# Patient Record
Sex: FEMALE | Race: WHITE | Employment: FULL TIME | URBAN - METROPOLITAN AREA
[De-identification: names, ages, dates, MRNs, and addresses within clinical notes are randomized per-mention and may not be internally consistent; named-entity substitution may affect disease eponyms.]

---

## 2017-03-21 ENCOUNTER — EMPLOYEE WELLNESS (OUTPATIENT)
Dept: OTHER | Age: 57
End: 2017-03-21

## 2017-10-27 ENCOUNTER — HOSPITAL ENCOUNTER (OUTPATIENT)
Dept: GENERAL RADIOLOGY | Age: 57
Discharge: HOME | End: 2017-10-27
Payer: COMMERCIAL

## 2017-10-27 ENCOUNTER — HOSPITAL ENCOUNTER (OUTPATIENT)
Age: 57
Discharge: HOME | End: 2017-10-27
Payer: COMMERCIAL

## 2017-10-27 DIAGNOSIS — M25.512 PAIN IN JOINT OF LEFT SHOULDER: ICD-10-CM

## 2017-10-27 PROCEDURE — 73030 X-RAY EXAM OF SHOULDER: CPT

## 2017-12-05 ENCOUNTER — INITIAL CONSULT (OUTPATIENT)
Dept: PULMONOLOGY | Age: 57
End: 2017-12-05
Payer: COMMERCIAL

## 2017-12-05 VITALS
RESPIRATION RATE: 12 BRPM | WEIGHT: 155 LBS | OXYGEN SATURATION: 98 % | HEART RATE: 103 BPM | SYSTOLIC BLOOD PRESSURE: 126 MMHG | BODY MASS INDEX: 26.46 KG/M2 | DIASTOLIC BLOOD PRESSURE: 74 MMHG | HEIGHT: 64 IN

## 2017-12-05 DIAGNOSIS — R06.83 SNORING: ICD-10-CM

## 2017-12-05 DIAGNOSIS — R40.0 DAYTIME SLEEPINESS: ICD-10-CM

## 2017-12-05 DIAGNOSIS — R68.2 DRY MOUTH: ICD-10-CM

## 2017-12-05 DIAGNOSIS — G47.8 NON-RESTORATIVE SLEEP: ICD-10-CM

## 2017-12-05 DIAGNOSIS — G47.8 DIFFICULTY WAKING: ICD-10-CM

## 2017-12-05 DIAGNOSIS — G47.10 HYPERSOMNIA: Primary | ICD-10-CM

## 2017-12-05 PROCEDURE — 99203 OFFICE O/P NEW LOW 30 MIN: CPT | Performed by: INTERNAL MEDICINE

## 2017-12-05 NOTE — PATIENT INSTRUCTIONS
Patient Education        Learning About CPAP for Sleep Apnea  What is CPAP? CPAP is a small machine that you use at home every night while you sleep. It increases air pressure in your throat to keep your airway open. When you have sleep apnea, this can help you sleep better so you feel much better. CPAP stands for \"continuous positive airway pressure. \"  The CPAP machine will have one of the following:  · A mask that covers your nose and mouth  · Prongs that fit into your nose  · A mask that covers your nose only, the most common type. This type is called NCPAP. The N stands for \"nasal.\"  Why is it done? CPAP is usually the best treatment for obstructive sleep apnea. It is the first treatment choice and the most widely used. Your doctor may suggest CPAP if you have:  · Moderate to severe sleep apnea. · Sleep apnea and coronary artery disease (CAD) or heart failure. How does it help? · CPAP can help you have more normal sleep, so you feel less sleepy and more alert during the daytime. · CPAP may help keep heart failure or other heart problems from getting worse. · CPAP may help lower your blood pressure. · If you use CPAP, your bed partner may also sleep better because you are not snoring or restless. What are the side effects? Some people who use CPAP have:  · A dry or stuffy nose and a sore throat. · Irritated skin on the face. · Sore eyes. · Bloating. If you have any of these problems, work with your doctor to fix them. Here are some things you can try:  · Be sure the mask or nasal prongs fit well. · See if your doctor can adjust the pressure of your CPAP. · If your nose is dry, try a humidifier. · If your nose is runny or stuffy, try decongestant medicine or a steroid nasal spray. Be safe with medicines. Read and follow all instructions on the label. Do not use the medicine longer than the label says. If these things do not help, you might try a different type of machine.  Some machines have air pressure that adjusts on its own. Others have air pressures that are different when you breathe in than when you breathe out. This may reduce discomfort caused by too much pressure in your nose. Where can you learn more? Go to https://myrna.Playlore. org and sign in to your Ahaali account. Enter N640 in the [employer] box to learn more about \"Learning About CPAP for Sleep Apnea. \"     If you do not have an account, please click on the \"Sign Up Now\" link. Current as of: March 25, 2017  Content Version: 11.3  © 7577-1480 [employer] Care instructions adapted under license by [employer] [facility] ([facility]). If you have questions about a medical condition or this instruction, always ask your healthcare professional. [place] [address] any warranty or liability for your use of this information. Patient Education        Sleep Apnea: Care Instructions  Your Care Instructions  Sleep apnea means that you frequently stop breathing for 10 seconds or longer during sleep. It can be mild to severe, based on the number of times an hour that you stop breathing or have slowed breathing. Blocked or narrowed airways in your nose, mouth, or throat can cause sleep apnea. Your airway can become blocked when your throat muscles and tongue relax during sleep. You can treat sleep apnea at home by making lifestyle changes. You also can use a CPAP breathing machine that keeps tissues in the throat from blocking your airway. Or your doctor may suggest that you use a breathing device while you sleep. It helps keep your airway open. This could be a device that you put in your mouth. Other examples include strips or disks that you use on your nose. In some cases, surgery may be needed to remove enlarged tissues in the throat. Follow-up care is a key part of your treatment and safety. Be sure to make and go to all appointments, and call your doctor if you are having problems.  It's also a good idea to know your test results and keep a list of the medicines you take. How can you care for yourself at home? · Lose weight, if needed. It may reduce the number of times you stop breathing or have slowed breathing. · Sleep on your side. It may stop mild apnea. If you tend to roll onto your back, sew a pocket in the back of your pajama top. Put a tennis ball into the pocket, and stitch the pocket shut. This will help keep you from sleeping on your back. · Avoid alcohol and medicines such as sleeping pills and sedatives before bed. · Do not smoke. Smoking can make sleep apnea worse. If you need help quitting, talk to your doctor about stop-smoking programs and medicines. These can increase your chances of quitting for good. · Prop up the head of your bed 4 to 6 inches by putting bricks under the legs of the bed. · Treat breathing problems, such as a stuffy nose, caused by a cold or allergies. · Try a continuous positive airway pressure (CPAP) breathing machine if your doctor recommends it. The machine keeps your airway open when you sleep. · If CPAP does not work for you, ask your doctor if you can try other breathing machines. A bilevel positive airway pressure machine uses one type of air pressure for breathing in and another type for breathing out. Another device raises or lowers air pressure as needed while you breathe. · Talk to your doctor if:  ¨ Your nose feels dry or bleeds when you use one of these machines. You may need to increase moisture in the air. A humidifier may help. ¨ Your nose is runny or stuffy from using a breathing machine. Decongestants or a corticosteroid nasal spray may help. ¨ You are sleepy during the day and it gets in the way of the normal things you do. Do not drive when you are drowsy. When should you call for help?   Watch closely for changes in your health, and be sure to contact your doctor if:  · You still have sleep apnea even though you have made lifestyle changes. · You are thinking of trying a device such as CPAP. · You are having problems using a CPAP or similar machine. Where can you learn more? Go to https://chpepicewBelkin International.[email or web address] org and sign in to your Venmo account. Enter X912 in the [facility] box to learn more about \"Sleep Apnea: Care Instructions. \"     If you do not have an account, please click on the \"Sign Up Now\" link. Current as of: March 25, 2017  Content Version: 11.3  © 2379-7339 [employer]. Care instructions adapted under license by [facility] ([facility]). If you have questions about a medical condition or this instruction, always ask your healthcare professional. [address] any warranty or liability for your use of this information. Patient Education        Heart Failure and Sleep Apnea: Care Instructions  Your Care Instructions    Sleep apnea is fairly common in people with advanced heart failure. Sleep apnea means you stop breathing for 10 seconds or longer during sleep. It may cause you to snore loudly and not sleep well, so you wake up feeling tired. Getting treatment for sleep apnea can help you sleep and feel better. It may also help keep your heart failure from getting worse. Follow-up care is a key part of your treatment and safety. Be sure to make and go to all appointments, and call your doctor if you are having problems. It's also a good idea to know your test results and keep a list of the medicines you take. How can you care for yourself at home? · Lose weight, if needed. It may reduce the number of times you stop breathing or have slowed breathing. · Go to bed at the same time every night. · Sleep on your side. It may stop mild apnea. If you tend to roll onto your back, sew a pocket in the back of your pajama top. Put a tennis ball into the pocket, and stitch the pocket shut. This will help keep you from sleeping on your back.   · Avoid alcohol and medicines such as sleeping pills and sedatives before bed. · Do not smoke. Smoking can make heart failure and sleep apnea worse. If you need help quitting, talk to your doctor about stop-smoking programs and medicines. These can increase your chances of quitting for good. · Prop up the head of your bed 4 to 6 inches by putting bricks under the legs of the bed. · Try a continuous positive airway pressure (CPAP) breathing machine if your doctor recommends it. The machine keeps your airway from closing when you sleep. · If CPAP does not work for you, ask your doctor if you can try another type of machine or device to help you breathe better. · If your nose feels dry or bleeds when you use one of these machines, talk with your doctor about increasing moisture in the air. A humidifier may help. · If your nose is runny or stuffy from using a breathing machine, talk with your doctor before using medicines to relieve congestion. · Talk to your doctor if you are sleepy during the day and it gets in the way of the normal things you do. Do not drive when you are drowsy. When should you call for help? Call your doctor now or seek immediate medical care if:  · You are dizzy or lightheaded, or you feel like you may faint. · You feel very tired. Watch closely for changes in your health, and be sure to contact your doctor if:  · You still have sleep apnea even though you have made lifestyle changes. · You are thinking of trying a device such as CPAP. · You are having problems using a CPAP or similar machine. Where can you learn more? Go to https://[employer].BECC. org and sign in to your WellTrackOne account. Enter A820 in the [employer] box to learn more about \"Heart Failure and Sleep Apnea: Care Instructions. \"     If you do not have an account, please click on the \"Sign Up Now\" link. Current as of: April 3, 2017  Content Version: 11.3  © 3551-3625 [employer].  Care instructions adapted under

## 2017-12-05 NOTE — PROGRESS NOTES
Prescriptions   Medication Sig Dispense Refill    colestipol (COLESTID) 1 G tablet Take 2 g by mouth daily       levothyroxine (SYNTHROID) 25 MCG tablet Take 50 mcg by mouth Daily       vitamin E 400 UNIT capsule Take 400 Units by mouth daily      NONFORMULARY multivit      Calcium Carbonate (CALCIUM 600 PO) Take by mouth      NONFORMULARY Vit d 5000 units      MAGNESIUM GLYCINATE PLUS PO Take 100 mg by mouth      traZODone (DESYREL) 50 MG tablet Take 25 mg by mouth nightly      aspirin 81 MG tablet Take 81 mg by mouth daily.  montelukast (SINGULAIR) 10 MG tablet Take 10 mg by mouth nightly.  cetirizine (ZYRTEC) 10 MG tablet Take 10 mg by mouth nightly.  buPROPion (WELLBUTRIN XL) 150 MG XL tablet Take 150 mg by mouth 2 times daily.  ALPRAZolam (XANAX) 1 MG tablet Take 0.5 mg by mouth 2 times daily as needed  .  Clidinium-Chlordiazepoxide (LIBRAX PO) Take 1 capsule by mouth 4 times daily as needed.  azelastine (ASTELIN) 137 MCG/SPRAY nasal spray 2 sprays by Nasal route nightly. Use in each nostril as directed      NONFORMULARY Progesterone cream      NONFORMULARY Testosterone cream       No current facility-administered medications for this visit. Family History   Problem Relation Age of Onset    High Cholesterol Mother     Heart Disease Mother     High Cholesterol Father     Stroke Father         Any family history of any sleep problems or any one in your family on CPAP? Yes    Caffeine Intake: How much soda (pop), coffee, tea, power drinks do you ingest per day? 3 per day. Employment History:  Where do you work? [employer]  What are your shifts? Second shift 3p to 11p    Any recent changes in shifts or hours? No    Physical Exam:    HEIGHTHeight: 5' 4\" (162.6 cm) WEIGHTWeight: 155 lb (70.3 kg)    BMI:  Body mass index is 26.61 kg/m².   Neck Size: 14  Oxygen Sat: 98%  ESS:  17  Vitals: /74   Pulse 103   Resp 12   Ht 5' 4\" (1.626 m)   Wt 155 lb (70.3 kg) SpO2 98% Comment: R/A at rest  BMI 26.61 kg/m²       Mallampati Score: 1    General appearance: alert and oriented to person, place and time, well-developed and well-nourished, in no acute distress  Nose: Nares normal. Septum midline. Mucosa normal. No drainage or sinus tenderness. Oropharynx:  Large tongue, crowded pharynx, mallampati class 2  Lungs: clear to auscultation bilaterally  Heart: regular rate and rhythm, S1, S2 normal, no murmur, click, rub or gallop  Extremities: extremities normal, atraumatic, no cyanosis or edema  Neurologic: Mental status: Alert, oriented, thought content appropriate      Assessment   1. Hypersomnia     2. Non-restorative sleep     3. Daytime sleepiness     4. Snoring     5. Difficulty waking     6. Dry mouth         Plan   PSH  Mask Desensitization and Pre study teaching? No  Weight Loss Information Given? No  Sleep Hygiene Discussed?  Yes

## 2017-12-11 ENCOUNTER — HOSPITAL ENCOUNTER (OUTPATIENT)
Dept: SLEEP CENTER | Age: 57
Discharge: HOME | End: 2017-12-11
Payer: COMMERCIAL

## 2017-12-11 DIAGNOSIS — R06.83 SNORING: ICD-10-CM

## 2017-12-11 DIAGNOSIS — G47.8 NON-RESTORATIVE SLEEP: ICD-10-CM

## 2017-12-11 DIAGNOSIS — R40.0 DAYTIME SLEEPINESS: ICD-10-CM

## 2017-12-11 DIAGNOSIS — G47.10 HYPERSOMNIA: ICD-10-CM

## 2017-12-11 DIAGNOSIS — R68.2 DRY MOUTH: ICD-10-CM

## 2017-12-11 DIAGNOSIS — G47.8 DIFFICULTY WAKING: ICD-10-CM

## 2017-12-11 PROCEDURE — 95810 POLYSOM 6/> YRS 4/> PARAM: CPT

## 2017-12-12 LAB — STATUS: NORMAL

## 2017-12-13 ENCOUNTER — OFFICE VISIT (OUTPATIENT)
Dept: CARDIOLOGY CLINIC | Age: 57
End: 2017-12-13
Payer: COMMERCIAL

## 2017-12-13 VITALS
WEIGHT: 155 LBS | HEIGHT: 64 IN | DIASTOLIC BLOOD PRESSURE: 80 MMHG | HEART RATE: 104 BPM | SYSTOLIC BLOOD PRESSURE: 130 MMHG | BODY MASS INDEX: 26.46 KG/M2

## 2017-12-13 DIAGNOSIS — R06.09 DYSPNEA ON EXERTION: ICD-10-CM

## 2017-12-13 DIAGNOSIS — R00.2 PALPITATIONS: Primary | ICD-10-CM

## 2017-12-13 DIAGNOSIS — R94.31 ABNORMAL ECG: ICD-10-CM

## 2017-12-13 PROCEDURE — 93000 ELECTROCARDIOGRAM COMPLETE: CPT | Performed by: NUCLEAR MEDICINE

## 2017-12-13 PROCEDURE — 99214 OFFICE O/P EST MOD 30 MIN: CPT | Performed by: NUCLEAR MEDICINE

## 2017-12-13 NOTE — PROGRESS NOTES
Pt here for check up for heart palpitations   Pt states the palpitations started getting worse about 4 month ago   States she does get some dizziness   Denies SOB, chest pain

## 2017-12-13 NOTE — PROGRESS NOTES
[facility] [address] Renée AKHTAR II.[facility] [address]  Dept: [phone]  Dept Fax: [phone]  Loc: [phone]    Visit Date: 12/13/2017    Melisa Paige is a 65 y.o. female who presents today for:  Chief Complaint   Patient presents with    Check-Up    Palpitations     Not seen in a while  Palpitation is acting up again  Some fatigue  More tired  Some dyspnea on exertion   Previous short SVT episodes  HR a little higher lately       HPI:  HPI  Past Medical History:   Diagnosis Date    Arthritis     Hypothyroid     IBS (irritable bowel syndrome)       Past Surgical History:   Procedure Laterality Date    ENDOMETRIAL ABLATION      TUBAL LIGATION       Family History   Problem Relation Age of Onset    High Cholesterol Mother     Heart Disease Mother     High Cholesterol Father     Stroke Father      Social History   Substance Use Topics    Smoking status: Never Smoker    Smokeless tobacco: Never Used    Alcohol use Yes      Current Outpatient Prescriptions   Medication Sig Dispense Refill    colestipol (COLESTID) 1 G tablet Take 2 g by mouth daily       levothyroxine (SYNTHROID) 25 MCG tablet Take 50 mcg by mouth Daily       vitamin E 400 UNIT capsule Take 400 Units by mouth daily      NONFORMULARY multivit      Calcium Carbonate (CALCIUM 600 PO) Take by mouth      NONFORMULARY Vit d 5000 units      MAGNESIUM GLYCINATE PLUS PO Take 100 mg by mouth      traZODone (DESYREL) 50 MG tablet Take 25 mg by mouth nightly      aspirin 81 MG tablet Take 81 mg by mouth daily.  montelukast (SINGULAIR) 10 MG tablet Take 10 mg by mouth nightly.  cetirizine (ZYRTEC) 10 MG tablet Take 10 mg by mouth nightly.  buPROPion (WELLBUTRIN XL) 150 MG XL tablet Take 150 mg by mouth 2 times daily.  ALPRAZolam (XANAX) 1 MG tablet Take 0.5 mg by mouth 2 times daily as needed  .       Clidinium-Chlordiazepoxide (LIBRAX PO) Take 1 capsule by mouth 4 of your patient. Please don't hesitate to contact me regarding any further issues related to the patient care      Orders Placed:  Orders Placed This Encounter   Procedures    EKG 12 Lead     Order Specific Question:   Reason for Exam?     Answer: Other       Medications Prescribed:  No orders of the defined types were placed in this encounter. Discussed use, benefit, and side effects of prescribed medications. All patient questions answered. Pt voiced understanding. Instructed to continue current medications, diet and exercise. Continue risk factor modification and medical management. Patient agreed with treatment plan. Follow up as directed.     Electronically signed by Navneet Fragoso MD on 12/13/2017 at 12:02 PM

## 2017-12-16 NOTE — PROGRESS NOTES
[address] [place] [address]                                SLEEP STUDY REPORT    PATIENT NAME: Iram Anthony                    :        1960  MED REC NO:   [id number]                           ROOM:  ACCOUNT NO:   [de-identified]                           ADMIT DATE: 2017  PROVIDER:     ABRAHAN Brown Grupomyra STUDY:  2017    REFERRING PROVIDER:  Mateo De La Cruz M.D. HISTORY OF PRESENT ILLNESS:  The patient is a 81-year-old female who is  referred by Dr. Mateo De La Cruz for a sleep diagnostic due to complaints of  excessive daytime somnolence. She has been experiencing, nonrestorative  sleep for a few years or associated features include snoring, falling  asleep while driving, reading, and watching TV. She works at second shift. She has good sleeping environment. No pets. Weight is at least 155  pounds, height 64 inches. BMI 26.6. METHODS:  The patient underwent digital polysomnography in compliance with  the standards and specifications from the AASM Manual including the  simultaneous recording of 3 EEG channels (F4-M1, C4-M1, and O2-M1 with back  up electrodes F3-M2, C3-M2, and O1-M2), 2 EOG channels (E1-M2, and E2-M1,),  EMG (chin, left & right leg), EKG, Nonin pulse oximetry with  less than 2  second averaging time, body position, airflow recorded by oral-nasal  thermal sensor and nasal air pressure transducer, plus respiratory effort  recorded by calibrated respiratory inductance plethysmography (RIP), flow  volume loop, sound and video. Sleep staging and scoring followed the  standard put forth by the American Academy of Sleep Medicine and utilized  the 4A obstructive hypopnea event desaturation of 4 percent or greater.     DETAILS OF THE STUDY:  Lights out 10:31 p.m., lights on 05:12 a.m., total  recording time 401 minutes, total sleep time 363 minutes, sleep efficiency  91%, latency to sleep 22 minutes, wake after sleep onset 15.7 minutes,  latency to REM 99 minutes. SLEEP STAGING:  The patient slept 70 minutes for 4.7% of total sleep time  in N1 sleep, 223 minutes in N2 sleep or 61% of total sleep time, 77 minutes  or 21% in N3 sleep, 46.5 minutes or 12.8% in REM sleep. RESPIRATORY SUMMARY:  There were 6 obstructive apneas, and 5 obstructive  hypopneas following an AHI of 1.8. During REM sleep, the obstructive  events were slightly more frequent but still within normal range with a REM  AHI of 2.6. BODY POSITION SUMMARY:  The patient slept 112 minutes supine and 251  minutes on the right side. SLEEP DISTURBANCE SUMMARY:  There were 63 arousals, out of which 24 were  related to respiratory events. LIMB MOVEMENT SUMMARY:  There were 35 events a PLM index of 5.8. PULSE OXIMETRY SUMMARY:  Mean oxygen saturation during wakefulness 97%,  during sleep 96%. Lowest oxygen saturation 91%. ECG SUMMARY:  The patient remained in normal sinus rhythm. ASSESSMENT:  Negative polysomnogram for  sleep disorder breathing or  movement disorder.     RECOMMENDATIONS:  There is no clear reason for the excessive daytime  somnolence experienced by the patient based on this polysomnogram.   Consider reviewing the sleep hygiene, medication list and alternative causes of  Fatigue ie; hypothyroidism and if necessary consider bringing the patient  about today at sleep lab for multiple sleep latency tests after overnight  polysomnogram.        Avinash Song M.D.    D: 12/15/2017 11:45:52       T: 12/16/2017 4:11:24     [id number]  Job#: [id number]     Doc#: [id number]    CC:

## 2017-12-18 ENCOUNTER — OFFICE VISIT (OUTPATIENT)
Dept: PULMONOLOGY | Age: 57
End: 2017-12-18
Payer: COMMERCIAL

## 2017-12-18 VITALS
HEIGHT: 64 IN | HEART RATE: 82 BPM | DIASTOLIC BLOOD PRESSURE: 86 MMHG | OXYGEN SATURATION: 98 % | SYSTOLIC BLOOD PRESSURE: 116 MMHG

## 2017-12-18 DIAGNOSIS — G47.9 SLEEP DISTURBANCE: Primary | ICD-10-CM

## 2017-12-18 PROCEDURE — 99213 OFFICE O/P EST LOW 20 MIN: CPT | Performed by: INTERNAL MEDICINE

## 2017-12-18 NOTE — PROGRESS NOTES
Sleep Medicine    Melbourne Cockayne, 65 y.o.  [id number]    Nurses Notes   Chucky Victor is here for a follow-up after PSG. Study Results    Initial Study Date -  12/11/17  AHI -  1.8    Total Events - 11  (Apneas  6  Hypopneas 5  Central  0)  LM w/Arousals - 2.5  Sleep Efficiency - 90.6 % (Total Sleep Time - 363.5 min)  Time with Sats below 88% - 0 min  Oxygen level - Room Air    ESS: 14    INTERVAL HISTORY         Melbourne Cockayne is a 65 y.o. old female who comes in to review the results of her recent sleep study    PM  Past Medical History:   Diagnosis Date    Arthritis     Hypothyroid     IBS (irritable bowel syndrome)      Past Surgical History:   Procedure Laterality Date    ENDOMETRIAL ABLATION      TUBAL LIGATION       Social History   Substance Use Topics    Smoking status: Never Smoker    Smokeless tobacco: Never Used    Alcohol use Yes     Family History   Problem Relation Age of Onset    High Cholesterol Mother     Heart Disease Mother     High Cholesterol Father     Stroke Father        ALLERGIES  Allergies   Allergen Reactions    Cleocin [Clindamycin] Diarrhea       MEDS  Current Outpatient Prescriptions   Medication Sig Dispense Refill    colestipol (COLESTID) 1 G tablet Take 2 g by mouth daily       levothyroxine (SYNTHROID) 25 MCG tablet Take 50 mcg by mouth Daily       vitamin E 400 UNIT capsule Take 400 Units by mouth daily      NONFORMULARY multivit      Calcium Carbonate (CALCIUM 600 PO) Take by mouth      NONFORMULARY Vit d 5000 units      MAGNESIUM GLYCINATE PLUS PO Take 100 mg by mouth      traZODone (DESYREL) 50 MG tablet Take 25 mg by mouth nightly      aspirin 81 MG tablet Take 81 mg by mouth daily.  montelukast (SINGULAIR) 10 MG tablet Take 10 mg by mouth nightly.  cetirizine (ZYRTEC) 10 MG tablet Take 10 mg by mouth nightly.  buPROPion (WELLBUTRIN XL) 150 MG XL tablet Take 150 mg by mouth 2 times daily.       ALPRAZolam (XANAX) 1 MG tablet Take 0.5 mg by mouth 2 times daily as needed  .  Clidinium-Chlordiazepoxide (LIBRAX PO) Take 1 capsule by mouth 4 times daily as needed.  azelastine (ASTELIN) 137 MCG/SPRAY nasal spray 2 sprays by Nasal route nightly. Use in each nostril as directed       No current facility-administered medications for this visit. EXAM  Vitals -  /86 (Site: Left Arm, Position: Sitting)   Pulse 82   Ht 5' 4\" (1.626 m)   SpO2 98%    There is no height or weight on file to calculate BMI. Dentition - Good  Constitutional - No distress. Patient is oriented to person, place, and time. Mouth/Throat - Oropharynx is clear and moist.   Neck - Neck supple. No JVD present. No tracheal deviation present. Psychiatric - Patient  has a normal mood and affect.     ASSESSMENT    Normal polysomnogram, no evidence of sleep disordered breathing or leg movement disorder     RECOMMENDATIONS    Reassurance  Sleep hygiene    FOLLOW UP     PRN

## 2017-12-18 NOTE — COMMUNICATION BODY
Sleep Medicine    Kristi Willard, 65 y.o.  [id number]    Nurses Notes   Raegan Rankin is here for a follow-up after PSG. Study Results    Initial Study Date -  12/11/17  AHI -  1.8    Total Events - 11  (Apneas  6  Hypopneas 5  Central  0)  LM w/Arousals - 2.5  Sleep Efficiency - 90.6 % (Total Sleep Time - 363.5 min)  Time with Sats below 88% - 0 min  Oxygen level - Room Air    ESS: 14    INTERVAL HISTORY         Kristi Willard is a 65 y.o. old female who comes in to review the results of her recent sleep study    PM  Past Medical History:   Diagnosis Date    Arthritis     Hypothyroid     IBS (irritable bowel syndrome)      Past Surgical History:   Procedure Laterality Date    ENDOMETRIAL ABLATION      TUBAL LIGATION       Social History   Substance Use Topics    Smoking status: Never Smoker    Smokeless tobacco: Never Used    Alcohol use Yes     Family History   Problem Relation Age of Onset    High Cholesterol Mother     Heart Disease Mother     High Cholesterol Father     Stroke Father        ALLERGIES  Allergies   Allergen Reactions    Cleocin [Clindamycin] Diarrhea       MEDS  Current Outpatient Prescriptions   Medication Sig Dispense Refill    colestipol (COLESTID) 1 G tablet Take 2 g by mouth daily       levothyroxine (SYNTHROID) 25 MCG tablet Take 50 mcg by mouth Daily       vitamin E 400 UNIT capsule Take 400 Units by mouth daily      NONFORMULARY multivit      Calcium Carbonate (CALCIUM 600 PO) Take by mouth      NONFORMULARY Vit d 5000 units      MAGNESIUM GLYCINATE PLUS PO Take 100 mg by mouth      traZODone (DESYREL) 50 MG tablet Take 25 mg by mouth nightly      aspirin 81 MG tablet Take 81 mg by mouth daily.  montelukast (SINGULAIR) 10 MG tablet Take 10 mg by mouth nightly.  cetirizine (ZYRTEC) 10 MG tablet Take 10 mg by mouth nightly.  buPROPion (WELLBUTRIN XL) 150 MG XL tablet Take 150 mg by mouth 2 times daily.       ALPRAZolam (XANAX) 1 MG tablet Take 0.5 mg by mouth 2 times daily as needed  .  Clidinium-Chlordiazepoxide (LIBRAX PO) Take 1 capsule by mouth 4 times daily as needed.  azelastine (ASTELIN) 137 MCG/SPRAY nasal spray 2 sprays by Nasal route nightly. Use in each nostril as directed       No current facility-administered medications for this visit. EXAM  Vitals -  /86 (Site: Left Arm, Position: Sitting)   Pulse 82   Ht 5' 4\" (1.626 m)   SpO2 98%    There is no height or weight on file to calculate BMI. Dentition - Good  Constitutional - No distress. Patient is oriented to person, place, and time. Mouth/Throat - Oropharynx is clear and moist.   Neck - Neck supple. No JVD present. No tracheal deviation present. Psychiatric - Patient  has a normal mood and affect.     ASSESSMENT    Normal polysomnogram, no evidence of sleep disordered breathing or leg movement disorder     RECOMMENDATIONS    Reassurance  Sleep hygiene    FOLLOW UP     PRN

## 2017-12-18 NOTE — LETTER
[facility]  [address]  Phone: [phone]  Fax: [phone]    Javon Kelly MD        December 18, 2017     David Trimble MD  [address] [place] [address] [address]  [id number]    Patient: Lili Wei  MR Number: [id number]  YOB: 1960  Date of Visit: 12/18/2017    Dear Dr. David Trimble: Thank you for the request for consultation for Jason Solis to me for the evaluation of snoring. Below are the relevant portions of my assessment and plan of care. Sleep Medicine    Lili Wei, 65 y.o.  [id number]    Nurses Notes   Michelle is here for a follow-up after PSG.   Study Results    Initial Study Date -  12/11/17  AHI -  1.8    Total Events - 11  (Apneas  6  Hypopneas 5  Central  0)  LM w/Arousals - 2.5  Sleep Efficiency - 90.6 % (Total Sleep Time - 363.5 min)  Time with Sats below 88% - 0 min  Oxygen level - Room Air    ESS: 14    INTERVAL HISTORY         Lili Wei is a 65 y.o. old female who comes in to review the results of her recent sleep study    PMH  Past Medical History:   Diagnosis Date    Arthritis     Hypothyroid     IBS (irritable bowel syndrome)      Past Surgical History:   Procedure Laterality Date    ENDOMETRIAL ABLATION      TUBAL LIGATION       Social History   Substance Use Topics    Smoking status: Never Smoker    Smokeless tobacco: Never Used    Alcohol use Yes     Family History   Problem Relation Age of Onset    High Cholesterol Mother     Heart Disease Mother     High Cholesterol Father     Stroke Father        ALLERGIES  Allergies   Allergen Reactions    Cleocin [Clindamycin] Diarrhea       MEDS  Current Outpatient Prescriptions   Medication Sig Dispense Refill    colestipol (COLESTID) 1 G tablet Take 2 g by mouth daily       levothyroxine (SYNTHROID) 25 MCG tablet Take 50 mcg by mouth Daily       vitamin E 400 UNIT capsule Take 400 Units by mouth daily      NONFORMULARY multivit  Calcium Carbonate (CALCIUM 600 PO) Take by mouth      NONFORMULARY Vit d 5000 units      MAGNESIUM GLYCINATE PLUS PO Take 100 mg by mouth      traZODone (DESYREL) 50 MG tablet Take 25 mg by mouth nightly      aspirin 81 MG tablet Take 81 mg by mouth daily.  montelukast (SINGULAIR) 10 MG tablet Take 10 mg by mouth nightly.  cetirizine (ZYRTEC) 10 MG tablet Take 10 mg by mouth nightly.  buPROPion (WELLBUTRIN XL) 150 MG XL tablet Take 150 mg by mouth 2 times daily.  ALPRAZolam (XANAX) 1 MG tablet Take 0.5 mg by mouth 2 times daily as needed  .  Clidinium-Chlordiazepoxide (LIBRAX PO) Take 1 capsule by mouth 4 times daily as needed.  azelastine (ASTELIN) 137 MCG/SPRAY nasal spray 2 sprays by Nasal route nightly. Use in each nostril as directed       No current facility-administered medications for this visit. EXAM  Vitals -  /86 (Site: Left Arm, Position: Sitting)   Pulse 82   Ht 5' 4\" (1.626 m)   SpO2 98%    There is no height or weight on file to calculate BMI. Dentition - Good  Constitutional - No distress. Patient is oriented to person, place, and time. Mouth/Throat - Oropharynx is clear and moist.   Neck - Neck supple. No JVD present. No tracheal deviation present. Psychiatric - Patient  has a normal mood and affect. ASSESSMENT    Normal polysomnogram, no evidence of sleep disordered breathing or leg movement disorder     RECOMMENDATIONS    Reassurance  Sleep hygiene    FOLLOW UP     PRN      If you have questions, please do not hesitate to call me. I look forward to following Alton Larry along with you.     Sincerely,        Rose Waggoner MD

## 2017-12-22 ENCOUNTER — HOSPITAL ENCOUNTER (OUTPATIENT)
Dept: NON INVASIVE DIAGNOSTICS | Age: 57
Discharge: HOME | End: 2017-12-22
Payer: COMMERCIAL

## 2017-12-22 DIAGNOSIS — R00.2 PALPITATIONS: ICD-10-CM

## 2017-12-22 DIAGNOSIS — R94.31 ABNORMAL ECG: ICD-10-CM

## 2017-12-22 DIAGNOSIS — R06.09 DYSPNEA ON EXERTION: ICD-10-CM

## 2017-12-22 LAB
LV EF: 71 %
LVEF MODALITY: NORMAL

## 2017-12-22 PROCEDURE — 93017 CV STRESS TEST TRACING ONLY: CPT | Performed by: NUCLEAR MEDICINE

## 2017-12-22 PROCEDURE — 78452 HT MUSCLE IMAGE SPECT MULT: CPT

## 2017-12-22 PROCEDURE — 93270 REMOTE 30 DAY ECG REV/REPORT: CPT

## 2017-12-22 PROCEDURE — A9500 TC99M SESTAMIBI: HCPCS | Performed by: NUCLEAR MEDICINE

## 2017-12-22 PROCEDURE — 3430000000 HC RX DIAGNOSTIC RADIOPHARMACEUTICAL: Performed by: NUCLEAR MEDICINE

## 2017-12-22 RX ADMIN — Medication 31.7 MILLICURIE: at 12:50

## 2017-12-22 RX ADMIN — Medication 9.4 MILLICURIE: at 11:10

## 2018-01-26 ENCOUNTER — HOSPITAL ENCOUNTER (OUTPATIENT)
Age: 58
Discharge: HOME | End: 2018-01-26
Payer: COMMERCIAL

## 2018-01-26 LAB
ALBUMIN SERPL-MCNC: 4.6 G/DL (ref 3.5–5.1)
ALP BLD-CCNC: 109 U/L (ref 38–126)
ALT SERPL-CCNC: 16 U/L (ref 11–66)
AMYLASE: 67 U/L (ref 20–104)
ANION GAP SERPL CALCULATED.3IONS-SCNC: 14 MEQ/L (ref 8–16)
AST SERPL-CCNC: 20 U/L (ref 5–40)
BASOPHILS # BLD: 0.5 %
BASOPHILS ABSOLUTE: 0 THOU/MM3 (ref 0–0.1)
BILIRUB SERPL-MCNC: 0.4 MG/DL (ref 0.3–1.2)
BUN BLDV-MCNC: 9 MG/DL (ref 7–22)
C-REACTIVE PROTEIN: < 0.03 MG/DL (ref 0–1)
CALCIUM SERPL-MCNC: 9.6 MG/DL (ref 8.5–10.5)
CHLORIDE BLD-SCNC: 103 MEQ/L (ref 98–111)
CO2: 26 MEQ/L (ref 23–33)
CREAT SERPL-MCNC: 0.9 MG/DL (ref 0.4–1.2)
EOSINOPHIL # BLD: 2.4 %
EOSINOPHILS ABSOLUTE: 0.1 THOU/MM3 (ref 0–0.4)
FERRITIN: 183 NG/ML (ref 10–291)
GFR SERPL CREATININE-BSD FRML MDRD: 65 ML/MIN/1.73M2
GLUCOSE BLD-MCNC: 96 MG/DL (ref 70–108)
HCT VFR BLD CALC: 40 % (ref 37–47)
HEMOGLOBIN: 13.6 GM/DL (ref 12–16)
IRON: 122 UG/DL (ref 50–170)
LIPASE: 44 U/L (ref 5.6–51.3)
LYMPHOCYTES # BLD: 47.8 %
LYMPHOCYTES ABSOLUTE: 2 THOU/MM3 (ref 1–4.8)
MCH RBC QN AUTO: 29.5 PG (ref 27–31)
MCHC RBC AUTO-ENTMCNC: 34 GM/DL (ref 33–37)
MCV RBC AUTO: 86.8 FL (ref 81–99)
MONOCYTES # BLD: 6.5 %
MONOCYTES ABSOLUTE: 0.3 THOU/MM3 (ref 0.4–1.3)
NUCLEATED RED BLOOD CELLS: 0 /100 WBC
PDW BLD-RTO: 13.7 % (ref 11.5–14.5)
PLATELET # BLD: 193 THOU/MM3 (ref 130–400)
PMV BLD AUTO: 10.9 MCM (ref 7.4–10.4)
POTASSIUM SERPL-SCNC: 4.4 MEQ/L (ref 3.5–5.2)
RBC # BLD: 4.61 MILL/MM3 (ref 4.2–5.4)
RHEUMATOID FACTOR: < 10 IU/ML (ref 0–13)
SEDIMENTATION RATE, ERYTHROCYTE: 7 MM/HR (ref 0–20)
SEG NEUTROPHILS: 42.8 %
SEGMENTED NEUTROPHILS ABSOLUTE COUNT: 1.8 THOU/MM3 (ref 1.8–7.7)
SODIUM BLD-SCNC: 143 MEQ/L (ref 135–145)
T3 TOTAL: 109 NG/DL (ref 72–181)
T4 FREE: 1.43 NG/DL (ref 0.93–1.76)
TOTAL CK: 82 U/L (ref 30–135)
TOTAL IRON BINDING CAPACITY: 325 UG/DL (ref 171–450)
TOTAL PROTEIN: 7.1 G/DL (ref 6.1–8)
TSH SERPL DL<=0.05 MIU/L-ACNC: 1.39 UIU/ML (ref 0.4–4.2)
URIC ACID: 3.8 MG/DL (ref 2.4–5.7)
VITAMIN B-12: 1026 PG/ML (ref 211–911)
WBC # BLD: 4.1 THOU/MM3 (ref 4.8–10.8)

## 2018-01-26 PROCEDURE — 82150 ASSAY OF AMYLASE: CPT

## 2018-01-26 PROCEDURE — 86140 C-REACTIVE PROTEIN: CPT

## 2018-01-26 PROCEDURE — 82728 ASSAY OF FERRITIN: CPT

## 2018-01-26 PROCEDURE — 82607 VITAMIN B-12: CPT

## 2018-01-26 PROCEDURE — 85651 RBC SED RATE NONAUTOMATED: CPT

## 2018-01-26 PROCEDURE — 86665 EPSTEIN-BARR CAPSID VCA: CPT

## 2018-01-26 PROCEDURE — 84480 ASSAY TRIIODOTHYRONINE (T3): CPT

## 2018-01-26 PROCEDURE — 86663 EPSTEIN-BARR ANTIBODY: CPT

## 2018-01-26 PROCEDURE — 83690 ASSAY OF LIPASE: CPT

## 2018-01-26 PROCEDURE — 36415 COLL VENOUS BLD VENIPUNCTURE: CPT

## 2018-01-26 PROCEDURE — 84550 ASSAY OF BLOOD/URIC ACID: CPT

## 2018-01-26 PROCEDURE — 86430 RHEUMATOID FACTOR TEST QUAL: CPT

## 2018-01-26 PROCEDURE — 80053 COMPREHEN METABOLIC PANEL: CPT

## 2018-01-26 PROCEDURE — 85025 COMPLETE CBC W/AUTO DIFF WBC: CPT

## 2018-01-26 PROCEDURE — 82550 ASSAY OF CK (CPK): CPT

## 2018-01-26 PROCEDURE — 83550 IRON BINDING TEST: CPT

## 2018-01-26 PROCEDURE — 86038 ANTINUCLEAR ANTIBODIES: CPT

## 2018-01-26 PROCEDURE — 86664 EPSTEIN-BARR NUCLEAR ANTIGEN: CPT

## 2018-01-26 PROCEDURE — 83540 ASSAY OF IRON: CPT

## 2018-01-26 PROCEDURE — 84439 ASSAY OF FREE THYROXINE: CPT

## 2018-01-26 PROCEDURE — 84443 ASSAY THYROID STIM HORMONE: CPT

## 2018-01-26 PROCEDURE — 84207 ASSAY OF VITAMIN B-6: CPT

## 2018-01-28 LAB — EPSTEIN-BARR VIRUS ANTIBODIES: NORMAL

## 2018-01-29 LAB — ANA SCREEN: NORMAL

## 2018-01-31 LAB — VITAMIN B6: 142.1 NMOL/L (ref 20–125)

## 2018-03-05 LAB — FASTING: YES

## 2018-03-07 ENCOUNTER — EMPLOYEE WELLNESS (OUTPATIENT)
Dept: OTHER | Age: 58
End: 2018-03-07

## 2018-03-07 LAB
CHOLESTEROL, TOTAL: 161 MG/DL (ref 0–199)
FASTING: YES
GLUCOSE BLD-MCNC: 105 MG/DL (ref 74–109)
HDLC SERPL-MCNC: 58 MG/DL (ref 40–90)
LDL CHOLESTEROL CALCULATED: 87 MG/DL
TRIGL SERPL-MCNC: 80 MG/DL (ref 0–199)

## 2018-03-20 VITALS — BODY MASS INDEX: 26.61 KG/M2 | WEIGHT: 155 LBS

## 2018-03-28 ENCOUNTER — HOSPITAL ENCOUNTER (OUTPATIENT)
Dept: MAMMOGRAPHY | Age: 58
Discharge: HOME | End: 2018-03-28
Payer: COMMERCIAL

## 2018-03-28 DIAGNOSIS — Z12.39 BREAST CANCER SCREENING: ICD-10-CM

## 2018-03-28 PROCEDURE — 77067 SCR MAMMO BI INCL CAD: CPT

## 2018-03-29 ENCOUNTER — OFFICE VISIT (OUTPATIENT)
Dept: CARDIOLOGY CLINIC | Age: 58
End: 2018-03-29
Payer: COMMERCIAL

## 2018-03-29 VITALS
BODY MASS INDEX: 26.12 KG/M2 | HEIGHT: 64 IN | DIASTOLIC BLOOD PRESSURE: 82 MMHG | WEIGHT: 153 LBS | HEART RATE: 80 BPM | SYSTOLIC BLOOD PRESSURE: 122 MMHG

## 2018-03-29 DIAGNOSIS — I47.1 SVT (SUPRAVENTRICULAR TACHYCARDIA) (HCC): ICD-10-CM

## 2018-03-29 DIAGNOSIS — R00.2 PALPITATION: Primary | ICD-10-CM

## 2018-03-29 PROCEDURE — 99213 OFFICE O/P EST LOW 20 MIN: CPT | Performed by: NUCLEAR MEDICINE

## 2018-03-29 RX ORDER — BUPROPION HYDROCHLORIDE 150 MG/1
150 TABLET, EXTENDED RELEASE ORAL 2 TIMES DAILY
COMMUNITY

## 2018-03-29 RX ORDER — ALPRAZOLAM 0.5 MG/1
0.5 TABLET ORAL 2 TIMES DAILY PRN
COMMUNITY

## 2018-03-29 RX ORDER — METOPROLOL SUCCINATE 25 MG/1
25 TABLET, EXTENDED RELEASE ORAL DAILY
Qty: 90 TABLET | Refills: 3 | Status: SHIPPED | OUTPATIENT
Start: 2018-03-29 | End: 2019-03-28 | Stop reason: SDUPTHER

## 2018-03-29 RX ORDER — LEVOTHYROXINE SODIUM 0.05 MG/1
50 TABLET ORAL DAILY
COMMUNITY

## 2018-03-29 NOTE — PROGRESS NOTES
[facility] PROFESSIONAL SERVS  [facility]  [facility]  [address] [address]  Dept: [phone]  Dept Fax: [phone]  Loc: [phone]    Visit Date: 3/29/2018    William Harris is a 65 y.o. female who presents today for:  Chief Complaint   Patient presents with    3 Month Follow-Up    Dizziness    Palpitations    Shortness of Breath   some palpitation  Event monitor was okay   No SVT seen   BP is stable  No chest pain  Some dizziness  No syncope        HPI:  HPI  Past Medical History:   Diagnosis Date    Arthritis     Hypothyroid     IBS (irritable bowel syndrome)       Past Surgical History:   Procedure Laterality Date    ENDOMETRIAL ABLATION      TUBAL LIGATION       Family History   Problem Relation Age of Onset    High Cholesterol Mother     Heart Disease Mother     High Cholesterol Father     Stroke Father      Social History   Substance Use Topics    Smoking status: Never Smoker    Smokeless tobacco: Never Used    Alcohol use Yes      Current Outpatient Prescriptions   Medication Sig Dispense Refill    ALPRAZolam (XANAX) 0.5 MG tablet Take 0.5 mg by mouth 2 times daily as needed for Anxiety.  buPROPion (WELLBUTRIN SR) 150 MG extended release tablet Take 150 mg by mouth 2 times daily      levothyroxine (SYNTHROID) 50 MCG tablet Take 50 mcg by mouth Daily      colestipol (COLESTID) 1 G tablet Take 2 g by mouth daily       vitamin E 400 UNIT capsule Take 400 Units by mouth daily      NONFORMULARY multivit      Calcium Carbonate (CALCIUM 600 PO) Take by mouth      NONFORMULARY Vit d 5000 units      MAGNESIUM GLYCINATE PLUS PO Take 100 mg by mouth      traZODone (DESYREL) 50 MG tablet Take 25 mg by mouth nightly      aspirin 81 MG tablet Take 81 mg by mouth daily.  montelukast (SINGULAIR) 10 MG tablet Take 10 mg by mouth nightly.  cetirizine (ZYRTEC) 10 MG tablet Take 10 mg by mouth nightly.       Clidinium-Chlordiazepoxide (LIBRAX PO) Take 1 capsule by mouth 4 times daily as needed.  azelastine (ASTELIN) 137 MCG/SPRAY nasal spray 2 sprays by Nasal route nightly. Use in each nostril as directed       No current facility-administered medications for this visit. Allergies   Allergen Reactions    Cleocin [Clindamycin] Diarrhea     Health Maintenance   Topic Date Due    DTaP/Tdap/Td vaccine (1 - Tdap) 06/04/1980    Cervical cancer screen  06/04/1982    Diabetes screen  06/04/2001    Shingles Vaccine (1 of 2 - 2 Dose Series) 06/04/2011    Colon cancer screen colonoscopy  06/04/2011    Flu vaccine (1) 09/01/2017    Breast cancer screen  02/17/2019    Lipid screen  03/07/2023    Hepatitis C screen  Completed    HIV screen  Completed       Subjective:  Review of Systems  General:   No fever, no chills, some fatigue or weight loss  Pulmonary:    some dyspnea, no wheezing  Cardiac:    Denies recent chest pain,   GI:     No nausea or vomiting, no abdominal pain  Neuro:     No dizziness or light headedness,   Musculoskeletal:  No recent active issues  Extremities:   No edema, good peripheral pulses      Objective:  Physical Exam  /82   Pulse 80   Ht 5' 4\" (1.626 m)   Wt 153 lb (69.4 kg)   BMI 26.26 kg/m²   General:   Well developed, well nourished  Lungs:    Clear to auscultation  Heart:    Normal S1 S2, Slight murmur. no rubs, no gallops  Abdomen:   Soft, non tender, no organomegalies, positive bowel sounds  Extremities:   No edema, no cyanosis, good peripheral pulses  Neurological:   Awake, alert, oriented. No obvious focal deficits  Musculoskelatal:  No obvious deformities    Assessment:     1. Palpitation     2. SVT (supraventricular tachycardia) (HCC)     likely anxiety issues for most part  No arrhythmia     Plan:  No Follow-up on file. Monitor for now  Beta blockers  Continue risk factor modification and medical management  Thank you for allowing me to participate in the care of your patient.  Please don't hesitate to contact me regarding any further issues related to the patient care      Orders Placed:  No orders of the defined types were placed in this encounter. Medications Prescribed:  No orders of the defined types were placed in this encounter. Discussed use, benefit, and side effects of prescribed medications. All patient questions answered. Pt voiced understanding. Instructed to continue current medications, diet and exercise. Continue risk factor modification and medical management. Patient agreed with treatment plan. Follow up as directed.     Electronically signed by Lion Lozano MD on 3/29/2018 at 10:25 AM

## 2018-04-02 VITALS — BODY MASS INDEX: 26.95 KG/M2 | WEIGHT: 157 LBS

## 2018-11-02 ENCOUNTER — HOSPITAL ENCOUNTER (OUTPATIENT)
Age: 58
Discharge: HOME | End: 2018-11-02
Payer: COMMERCIAL

## 2018-11-02 LAB
ALBUMIN SERPL-MCNC: 4.5 G/DL (ref 3.5–5.1)
ALP BLD-CCNC: 98 U/L (ref 38–126)
ALT SERPL-CCNC: 27 U/L (ref 11–66)
ANION GAP SERPL CALCULATED.3IONS-SCNC: 12 MEQ/L (ref 8–16)
AST SERPL-CCNC: 25 U/L (ref 5–40)
AVERAGE GLUCOSE: 75 MG/DL (ref 70–126)
BASOPHILS # BLD: 0.9 %
BASOPHILS ABSOLUTE: 0 THOU/MM3 (ref 0–0.1)
BILIRUB SERPL-MCNC: 0.6 MG/DL (ref 0.3–1.2)
BUN BLDV-MCNC: 10 MG/DL (ref 7–22)
CALCIUM SERPL-MCNC: 9.8 MG/DL (ref 8.5–10.5)
CHLORIDE BLD-SCNC: 101 MEQ/L (ref 98–111)
CHOLESTEROL, TOTAL: 203 MG/DL (ref 100–199)
CO2: 26 MEQ/L (ref 23–33)
CREAT SERPL-MCNC: 0.9 MG/DL (ref 0.4–1.2)
EOSINOPHIL # BLD: 1.8 %
EOSINOPHILS ABSOLUTE: 0.1 THOU/MM3 (ref 0–0.4)
ERYTHROCYTE [DISTWIDTH] IN BLOOD BY AUTOMATED COUNT: 13.1 % (ref 11.5–14.5)
ERYTHROCYTE [DISTWIDTH] IN BLOOD BY AUTOMATED COUNT: 42.2 FL (ref 35–45)
GFR SERPL CREATININE-BSD FRML MDRD: 64 ML/MIN/1.73M2
GLUCOSE BLD-MCNC: 88 MG/DL (ref 70–108)
HBA1C MFR BLD: 4.5 % (ref 4.4–6.4)
HCT VFR BLD CALC: 41.7 % (ref 37–47)
HDLC SERPL-MCNC: 64 MG/DL
HEMOGLOBIN: 13.8 GM/DL (ref 12–16)
IMMATURE GRANS (ABS): 0 THOU/MM3 (ref 0–0.07)
IMMATURE GRANULOCYTES: 0 %
LDL CHOLESTEROL CALCULATED: 126 MG/DL
LYMPHOCYTES # BLD: 39.6 %
LYMPHOCYTES ABSOLUTE: 1.7 THOU/MM3 (ref 1–4.8)
MCH RBC QN AUTO: 29.1 PG (ref 26–33)
MCHC RBC AUTO-ENTMCNC: 33.1 GM/DL (ref 32.2–35.5)
MCV RBC AUTO: 88 FL (ref 81–99)
MONOCYTES # BLD: 8.9 %
MONOCYTES ABSOLUTE: 0.4 THOU/MM3 (ref 0.4–1.3)
NUCLEATED RED BLOOD CELLS: 0 /100 WBC
PLATELET # BLD: 226 THOU/MM3 (ref 130–400)
PMV BLD AUTO: 11.5 FL (ref 9.4–12.4)
POTASSIUM SERPL-SCNC: 4 MEQ/L (ref 3.5–5.2)
RBC # BLD: 4.74 MILL/MM3 (ref 4.2–5.4)
SEG NEUTROPHILS: 48.8 %
SEGMENTED NEUTROPHILS ABSOLUTE COUNT: 2.1 THOU/MM3 (ref 1.8–7.7)
SODIUM BLD-SCNC: 139 MEQ/L (ref 135–145)
T3 TOTAL: 136 NG/DL (ref 72–181)
T4 FREE: 1.45 NG/DL (ref 0.93–1.76)
TOTAL PROTEIN: 7.1 G/DL (ref 6.1–8)
TRIGL SERPL-MCNC: 63 MG/DL (ref 0–199)
TSH SERPL DL<=0.05 MIU/L-ACNC: 2.47 UIU/ML (ref 0.4–4.2)
WBC # BLD: 4.4 THOU/MM3 (ref 4.8–10.8)

## 2018-11-02 PROCEDURE — 84480 ASSAY TRIIODOTHYRONINE (T3): CPT

## 2018-11-02 PROCEDURE — 84439 ASSAY OF FREE THYROXINE: CPT

## 2018-11-02 PROCEDURE — 80053 COMPREHEN METABOLIC PANEL: CPT

## 2018-11-02 PROCEDURE — 83036 HEMOGLOBIN GLYCOSYLATED A1C: CPT

## 2018-11-02 PROCEDURE — 84443 ASSAY THYROID STIM HORMONE: CPT

## 2018-11-02 PROCEDURE — 36415 COLL VENOUS BLD VENIPUNCTURE: CPT

## 2018-11-02 PROCEDURE — 80061 LIPID PANEL: CPT

## 2018-11-02 PROCEDURE — 85025 COMPLETE CBC W/AUTO DIFF WBC: CPT

## 2019-03-28 ENCOUNTER — OFFICE VISIT (OUTPATIENT)
Dept: CARDIOLOGY CLINIC | Age: 59
End: 2019-03-28
Payer: COMMERCIAL

## 2019-03-28 VITALS
SYSTOLIC BLOOD PRESSURE: 118 MMHG | HEIGHT: 64 IN | HEART RATE: 71 BPM | WEIGHT: 164 LBS | DIASTOLIC BLOOD PRESSURE: 76 MMHG | BODY MASS INDEX: 28 KG/M2

## 2019-03-28 DIAGNOSIS — I47.1 SVT (SUPRAVENTRICULAR TACHYCARDIA) (HCC): ICD-10-CM

## 2019-03-28 DIAGNOSIS — R00.2 PALPITATION: Primary | ICD-10-CM

## 2019-03-28 DIAGNOSIS — R06.09 DYSPNEA ON EXERTION: ICD-10-CM

## 2019-03-28 PROCEDURE — 93000 ELECTROCARDIOGRAM COMPLETE: CPT | Performed by: NUCLEAR MEDICINE

## 2019-03-28 PROCEDURE — 99213 OFFICE O/P EST LOW 20 MIN: CPT | Performed by: NUCLEAR MEDICINE

## 2019-03-28 RX ORDER — METOPROLOL SUCCINATE 25 MG/1
25 TABLET, EXTENDED RELEASE ORAL DAILY
Qty: 90 TABLET | Refills: 3 | Status: SHIPPED | OUTPATIENT
Start: 2019-03-28 | End: 2020-03-31 | Stop reason: SDUPTHER

## 2019-04-05 LAB
CHOLESTEROL, TOTAL: 168 MG/DL (ref 0–199)
FASTING: YES
GLUCOSE BLD-MCNC: 90 MG/DL (ref 74–109)
HDLC SERPL-MCNC: 50 MG/DL (ref 40–90)
LDL CHOLESTEROL CALCULATED: 96 MG/DL
TRIGL SERPL-MCNC: 109 MG/DL (ref 0–199)

## 2019-05-13 ENCOUNTER — HOSPITAL ENCOUNTER (OUTPATIENT)
Dept: MAMMOGRAPHY | Age: 59
Discharge: HOME | End: 2019-05-13
Payer: COMMERCIAL

## 2019-05-13 DIAGNOSIS — Z12.39 BREAST CANCER SCREENING: ICD-10-CM

## 2019-05-13 PROCEDURE — 77063 BREAST TOMOSYNTHESIS BI: CPT

## 2019-11-09 ENCOUNTER — HOSPITAL ENCOUNTER (EMERGENCY)
Dept: GENERAL RADIOLOGY | Age: 59
Discharge: HOME | End: 2019-11-09
Payer: COMMERCIAL

## 2019-11-09 ENCOUNTER — HOSPITAL ENCOUNTER (EMERGENCY)
Dept: CT IMAGING | Age: 59
Discharge: HOME | End: 2019-11-09
Payer: COMMERCIAL

## 2019-11-09 ENCOUNTER — HOSPITAL ENCOUNTER (EMERGENCY)
Age: 59
Discharge: HOME | End: 2019-11-09
Payer: COMMERCIAL

## 2019-11-09 VITALS
TEMPERATURE: 96.9 F | HEART RATE: 89 BPM | DIASTOLIC BLOOD PRESSURE: 91 MMHG | SYSTOLIC BLOOD PRESSURE: 146 MMHG | OXYGEN SATURATION: 100 % | RESPIRATION RATE: 16 BRPM

## 2019-11-09 DIAGNOSIS — S01.81XA FACIAL LACERATION, INITIAL ENCOUNTER: Primary | ICD-10-CM

## 2019-11-09 PROCEDURE — 73564 X-RAY EXAM KNEE 4 OR MORE: CPT

## 2019-11-09 PROCEDURE — 90471 IMMUNIZATION ADMIN: CPT | Performed by: NURSE PRACTITIONER

## 2019-11-09 PROCEDURE — 12001 RPR S/N/AX/GEN/TRNK 2.5CM/<: CPT | Performed by: NURSE PRACTITIONER

## 2019-11-09 PROCEDURE — 6360000002 HC RX W HCPCS: Performed by: NURSE PRACTITIONER

## 2019-11-09 PROCEDURE — 99213 OFFICE O/P EST LOW 20 MIN: CPT

## 2019-11-09 PROCEDURE — 90715 TDAP VACCINE 7 YRS/> IM: CPT | Performed by: NURSE PRACTITIONER

## 2019-11-09 PROCEDURE — 99214 OFFICE O/P EST MOD 30 MIN: CPT | Performed by: NURSE PRACTITIONER

## 2019-11-09 PROCEDURE — 70450 CT HEAD/BRAIN W/O DYE: CPT

## 2019-11-09 PROCEDURE — 12001 RPR S/N/AX/GEN/TRNK 2.5CM/<: CPT

## 2019-11-09 RX ORDER — LIDOCAINE HYDROCHLORIDE 10 MG/ML
5 INJECTION, SOLUTION INFILTRATION; PERINEURAL ONCE
Status: DISCONTINUED | OUTPATIENT
Start: 2019-11-09 | End: 2019-11-09 | Stop reason: HOSPADM

## 2019-11-09 RX ORDER — CEPHALEXIN 250 MG/1
250 CAPSULE ORAL 4 TIMES DAILY
Qty: 40 CAPSULE | Refills: 0 | Status: SHIPPED | OUTPATIENT
Start: 2019-11-09 | End: 2019-11-19

## 2019-11-09 RX ADMIN — TETANUS TOXOID, REDUCED DIPHTHERIA TOXOID AND ACELLULAR PERTUSSIS VACCINE, ADSORBED 0.5 ML: 5; 2.5; 8; 8; 2.5 SUSPENSION INTRAMUSCULAR at 12:22

## 2019-11-09 ASSESSMENT — ENCOUNTER SYMPTOMS
VOMITING: 0
ABDOMINAL PAIN: 0
COLOR CHANGE: 0
BACK PAIN: 0
DIARRHEA: 0
NAUSEA: 0

## 2019-11-09 ASSESSMENT — PAIN DESCRIPTION - ORIENTATION
ORIENTATION_2: LEFT
ORIENTATION: ANTERIOR

## 2019-11-09 ASSESSMENT — PAIN DESCRIPTION - DESCRIPTORS
DESCRIPTORS_2: CONSTANT
DESCRIPTORS: OTHER (COMMENT);BURNING

## 2019-11-09 ASSESSMENT — PAIN DESCRIPTION - PAIN TYPE: TYPE: ACUTE PAIN

## 2019-11-09 ASSESSMENT — PAIN DESCRIPTION - DURATION: DURATION_2: CONTINUOUS

## 2019-11-09 ASSESSMENT — PAIN DESCRIPTION - LOCATION
LOCATION: HEAD
LOCATION_2: KNEE

## 2019-11-09 ASSESSMENT — PAIN DESCRIPTION - INTENSITY: RATING_2: 5

## 2019-11-09 ASSESSMENT — PAIN SCALES - GENERAL: PAINLEVEL_OUTOF10: 6

## 2019-11-09 ASSESSMENT — PAIN DESCRIPTION - FREQUENCY: FREQUENCY: CONTINUOUS

## 2019-12-31 ENCOUNTER — HOSPITAL ENCOUNTER (OUTPATIENT)
Age: 59
Discharge: HOME | End: 2019-12-31
Payer: COMMERCIAL

## 2019-12-31 LAB
ALBUMIN SERPL-MCNC: 4.7 G/DL (ref 3.5–5.1)
ALP BLD-CCNC: 121 U/L (ref 38–126)
ALT SERPL-CCNC: 32 U/L (ref 11–66)
ANION GAP SERPL CALCULATED.3IONS-SCNC: 11 MEQ/L (ref 8–16)
AST SERPL-CCNC: 27 U/L (ref 5–40)
AVERAGE GLUCOSE: 87 MG/DL (ref 70–126)
BASOPHILS # BLD: 1.2 %
BASOPHILS ABSOLUTE: 0.1 THOU/MM3 (ref 0–0.1)
BILIRUB SERPL-MCNC: 0.4 MG/DL (ref 0.3–1.2)
BUN BLDV-MCNC: 9 MG/DL (ref 7–22)
CALCIUM SERPL-MCNC: 10 MG/DL (ref 8.5–10.5)
CHLORIDE BLD-SCNC: 103 MEQ/L (ref 98–111)
CHOLESTEROL, TOTAL: 218 MG/DL (ref 100–199)
CO2: 27 MEQ/L (ref 23–33)
CREAT SERPL-MCNC: 0.8 MG/DL (ref 0.4–1.2)
EOSINOPHIL # BLD: 3 %
EOSINOPHILS ABSOLUTE: 0.2 THOU/MM3 (ref 0–0.4)
ERYTHROCYTE [DISTWIDTH] IN BLOOD BY AUTOMATED COUNT: 12.7 % (ref 11.5–14.5)
ERYTHROCYTE [DISTWIDTH] IN BLOOD BY AUTOMATED COUNT: 41.3 FL (ref 35–45)
GFR SERPL CREATININE-BSD FRML MDRD: 74 ML/MIN/1.73M2
GLUCOSE BLD-MCNC: 91 MG/DL (ref 70–108)
HBA1C MFR BLD: 4.9 % (ref 4.4–6.4)
HCT VFR BLD CALC: 41.8 % (ref 37–47)
HDLC SERPL-MCNC: 67 MG/DL
HEMOGLOBIN: 13.7 GM/DL (ref 12–16)
IMMATURE GRANS (ABS): 0 THOU/MM3 (ref 0–0.07)
IMMATURE GRANULOCYTES: 0 %
LDL CHOLESTEROL CALCULATED: 133 MG/DL
LYMPHOCYTES # BLD: 42.3 %
LYMPHOCYTES ABSOLUTE: 2.1 THOU/MM3 (ref 1–4.8)
MCH RBC QN AUTO: 29 PG (ref 26–33)
MCHC RBC AUTO-ENTMCNC: 32.8 GM/DL (ref 32.2–35.5)
MCV RBC AUTO: 88.6 FL (ref 81–99)
MONOCYTES # BLD: 6.4 %
MONOCYTES ABSOLUTE: 0.3 THOU/MM3 (ref 0.4–1.3)
NUCLEATED RED BLOOD CELLS: 0 /100 WBC
PLATELET # BLD: 233 THOU/MM3 (ref 130–400)
PMV BLD AUTO: 11.3 FL (ref 9.4–12.4)
POTASSIUM SERPL-SCNC: 4.2 MEQ/L (ref 3.5–5.2)
RBC # BLD: 4.72 MILL/MM3 (ref 4.2–5.4)
SEG NEUTROPHILS: 47.1 %
SEGMENTED NEUTROPHILS ABSOLUTE COUNT: 2.4 THOU/MM3 (ref 1.8–7.7)
SODIUM BLD-SCNC: 141 MEQ/L (ref 135–145)
T4 FREE: 1.25 NG/DL (ref 0.93–1.76)
TOTAL PROTEIN: 7.2 G/DL (ref 6.1–8)
TRIGL SERPL-MCNC: 89 MG/DL (ref 0–199)
TSH SERPL DL<=0.05 MIU/L-ACNC: 2.25 UIU/ML (ref 0.4–4.2)
WBC # BLD: 5 THOU/MM3 (ref 4.8–10.8)

## 2019-12-31 PROCEDURE — 85025 COMPLETE CBC W/AUTO DIFF WBC: CPT

## 2019-12-31 PROCEDURE — 84439 ASSAY OF FREE THYROXINE: CPT

## 2019-12-31 PROCEDURE — 80053 COMPREHEN METABOLIC PANEL: CPT

## 2019-12-31 PROCEDURE — 84443 ASSAY THYROID STIM HORMONE: CPT

## 2019-12-31 PROCEDURE — 80061 LIPID PANEL: CPT

## 2019-12-31 PROCEDURE — 36415 COLL VENOUS BLD VENIPUNCTURE: CPT

## 2019-12-31 PROCEDURE — 83036 HEMOGLOBIN GLYCOSYLATED A1C: CPT

## 2020-03-31 RX ORDER — METOPROLOL SUCCINATE 25 MG/1
25 TABLET, EXTENDED RELEASE ORAL DAILY
Qty: 90 TABLET | Refills: 0 | Status: SHIPPED | OUTPATIENT
Start: 2020-03-31 | End: 2020-06-15 | Stop reason: SDUPTHER

## 2020-03-31 NOTE — TELEPHONE ENCOUNTER
Melissa Jarquin called requesting a refill on the following medications:  Requested Prescriptions     Pending Prescriptions Disp Refills    metoprolol succinate (TOPROL XL) 25 MG extended release tablet 90 tablet 3     Sig: Take 1 tablet by mouth daily     Pharmacy verified: [id number] [address] [facility]      Date of last visit:  3/28/2019  Date of next visit (if applicable): 8/87/3106

## 2020-06-15 RX ORDER — METOPROLOL SUCCINATE 25 MG/1
25 TABLET, EXTENDED RELEASE ORAL DAILY
Qty: 90 TABLET | Refills: 0 | Status: SHIPPED | OUTPATIENT
Start: 2020-06-15 | End: 2020-07-27 | Stop reason: SDUPTHER

## 2020-07-10 ENCOUNTER — HOSPITAL ENCOUNTER (OUTPATIENT)
Dept: MAMMOGRAPHY | Age: 60
Discharge: HOME | End: 2020-07-10
Payer: COMMERCIAL

## 2020-07-10 PROCEDURE — 77063 BREAST TOMOSYNTHESIS BI: CPT

## 2020-07-27 ENCOUNTER — OFFICE VISIT (OUTPATIENT)
Dept: CARDIOLOGY CLINIC | Age: 60
End: 2020-07-27
Payer: COMMERCIAL

## 2020-07-27 VITALS
WEIGHT: 167.4 LBS | HEIGHT: 64 IN | DIASTOLIC BLOOD PRESSURE: 60 MMHG | HEART RATE: 79 BPM | BODY MASS INDEX: 28.58 KG/M2 | SYSTOLIC BLOOD PRESSURE: 120 MMHG

## 2020-07-27 PROCEDURE — 99213 OFFICE O/P EST LOW 20 MIN: CPT | Performed by: NUCLEAR MEDICINE

## 2020-07-27 PROCEDURE — 93000 ELECTROCARDIOGRAM COMPLETE: CPT | Performed by: NUCLEAR MEDICINE

## 2020-07-27 RX ORDER — METOPROLOL SUCCINATE 25 MG/1
25 TABLET, EXTENDED RELEASE ORAL DAILY
Qty: 90 TABLET | Refills: 3 | Status: SHIPPED | OUTPATIENT
Start: 2020-07-27 | End: 2021-09-13

## 2020-07-27 RX ORDER — PANTOPRAZOLE SODIUM 40 MG/1
40 TABLET, DELAYED RELEASE ORAL DAILY
COMMUNITY
Start: 2020-06-12

## 2020-07-27 NOTE — PROGRESS NOTES
[address] [facility],[place] [address] [address] [facility] [address]  [facility] [address]  Dept: [phone]  Dept Fax: [phone]  Loc: [phone]    Visit Date: 7/27/2020    Betsy Perez is a 62 y.o. female who presents todayfor:  Chief Complaint   Patient presents with    Check-Up    Palpitations    Hypertension   known SVT   No long episodes  No ER visits  No BP issues  No dizziness  No syncope  No angina  No changes in breathing         HPI:  HPI  Past Medical History:   Diagnosis Date    Arthritis     Hypothyroid     IBS (irritable bowel syndrome)       Past Surgical History:   Procedure Laterality Date    ENDOMETRIAL ABLATION      TUBAL LIGATION       Family History   Problem Relation Age of Onset    High Cholesterol Mother     Heart Disease Mother     High Cholesterol Father     Stroke Father      Social History     Tobacco Use    Smoking status: Never Smoker    Smokeless tobacco: Never Used   Substance Use Topics    Alcohol use: Yes      Current Outpatient Medications   Medication Sig Dispense Refill    pantoprazole (PROTONIX) 40 MG tablet Take 40 mg by mouth daily       metoprolol succinate (TOPROL XL) 25 MG extended release tablet Take 1 tablet by mouth daily 90 tablet 0    ALPRAZolam (XANAX) 0.5 MG tablet Take 0.5 mg by mouth 2 times daily as needed for Anxiety.  buPROPion (WELLBUTRIN SR) 150 MG extended release tablet Take 150 mg by mouth 2 times daily      levothyroxine (SYNTHROID) 50 MCG tablet Take 50 mcg by mouth Daily      colestipol (COLESTID) 1 G tablet Take 2 g by mouth daily       vitamin E 400 UNIT capsule Take 400 Units by mouth daily      NONFORMULARY multivit      Calcium Carbonate (CALCIUM 600 PO) Take by mouth      NONFORMULARY Vit d 5000 units      MAGNESIUM GLYCINATE PLUS PO Take 100 mg by mouth      traZODone (DESYREL) 50 MG tablet Take 25 mg by mouth nightly      aspirin 81 MG tablet Take 81 mg by mouth daily.       montelukast (SINGULAIR) 10 MG tablet Take 10 mg by mouth nightly.  cetirizine (ZYRTEC) 10 MG tablet Take 10 mg by mouth nightly.  Clidinium-Chlordiazepoxide (LIBRAX PO) Take 1 capsule by mouth 4 times daily as needed.  azelastine (ASTELIN) 137 MCG/SPRAY nasal spray 2 sprays by Nasal route nightly. Use in each nostril as directed       No current facility-administered medications for this visit. Allergies   Allergen Reactions    Cleocin [Clindamycin] Diarrhea     Health Maintenance   Topic Date Due    Cervical cancer screen  06/04/1982    Shingles Vaccine (1 of 2) 06/04/2011    Colon cancer screen colonoscopy  06/04/2011    Flu vaccine (1) 09/01/2020    Breast cancer screen  07/10/2022    Lipid screen  12/31/2024    DTaP/Tdap/Td vaccine (2 - Td) 11/09/2029    Hepatitis C screen  Completed    HIV screen  Completed    Hepatitis A vaccine  Aged Out    Hepatitis B vaccine  Aged Out    Hib vaccine  Aged Out    Meningococcal (ACWY) vaccine  Aged Out    Pneumococcal 0-64 years Vaccine  Aged Out       Subjective:  Review of Systems  General:   No fever, no chills, some fatigue or weight loss  Pulmonary:    some dyspnea, no wheezing  Cardiac:    Denies recent chest pain,   GI:     No nausea or vomiting, no abdominal pain  Neuro:    No dizziness or light headedness,   Musculoskeletal:  No recent active issues  Extremities:   No edema, no obvious claudication       Objective:  Physical Exam  /60   Pulse 79   Ht 5' 4\" (1.626 m)   Wt 167 lb 6.4 oz (75.9 kg)   BMI 28.73 kg/m²   General:   Well developed, well nourished  Lungs:   Clear to auscultation  Heart:    Normal S1 S2, Slight murmur. no rubs, no gallops  Abdomen:   Soft, non tender, no organomegalies, positive bowel sounds  Extremities:   No edema, no cyanosis, good peripheral pulses  Neurological:   Awake, alert, oriented.  No obvious focal deficits  Musculoskelatal:  No obvious deformities    Assessment:      Diagnosis Orders 1. Palpitations  EKG 12 Lead   2. Other chest pain  EKG 12 Lead   3. Essential hypertension     cardiac fair for now  ECG in office was done today. I reviewed the ECG. No acute findings      Plan:  No follow-ups on file. As above  Continue risk factor modification and medical management  Thank you for allowing me to participate in the care of your patient. Please don't hesitate to contact me regarding any further issues related to the patient care    Orders Placed:  Orders Placed This Encounter   Procedures    EKG 12 Lead     Order Specific Question:   Reason for Exam?     Answer: Other       Medications Prescribed:  No orders of the defined types were placed in this encounter. Discussed use, benefit, and side effects of prescribed medications. All patient questions answered. Pt voicedunderstanding. Instructed to continue current medications, diet and exercise. Continue risk factor modification and medical management. Patient agreed with treatment plan. Follow up as directed.     Electronically signedby Vanessa Morejon MD on 7/27/2020 at 10:23 AM

## 2021-04-09 ENCOUNTER — HOSPITAL ENCOUNTER (OUTPATIENT)
Age: 61
Discharge: HOME | End: 2021-04-09
Payer: COMMERCIAL

## 2021-04-09 LAB
ALBUMIN SERPL-MCNC: 4.3 G/DL (ref 3.5–5.1)
ALP BLD-CCNC: 88 U/L (ref 38–126)
ALT SERPL-CCNC: 17 U/L (ref 11–66)
ANION GAP SERPL CALCULATED.3IONS-SCNC: 11 MEQ/L (ref 8–16)
AST SERPL-CCNC: 22 U/L (ref 5–40)
BASOPHILS # BLD: 1 %
BASOPHILS ABSOLUTE: 0 THOU/MM3 (ref 0–0.1)
BILIRUB SERPL-MCNC: 0.4 MG/DL (ref 0.3–1.2)
BUN BLDV-MCNC: 7 MG/DL (ref 7–22)
CALCIUM SERPL-MCNC: 9.3 MG/DL (ref 8.5–10.5)
CHLORIDE BLD-SCNC: 104 MEQ/L (ref 98–111)
CHOLESTEROL, TOTAL: 140 MG/DL (ref 100–199)
CO2: 25 MEQ/L (ref 23–33)
CREAT SERPL-MCNC: 0.9 MG/DL (ref 0.4–1.2)
EOSINOPHIL # BLD: 2 %
EOSINOPHILS ABSOLUTE: 0.1 THOU/MM3 (ref 0–0.4)
ERYTHROCYTE [DISTWIDTH] IN BLOOD BY AUTOMATED COUNT: 13.3 % (ref 11.5–14.5)
ERYTHROCYTE [DISTWIDTH] IN BLOOD BY AUTOMATED COUNT: 43.2 FL (ref 35–45)
GFR SERPL CREATININE-BSD FRML MDRD: 64 ML/MIN/1.73M2
GLUCOSE FASTING: 86 MG/DL (ref 70–108)
HCT VFR BLD CALC: 39.7 % (ref 37–47)
HDLC SERPL-MCNC: 50 MG/DL
HEMOGLOBIN: 12.7 GM/DL (ref 12–16)
IMMATURE GRANS (ABS): 0 THOU/MM3 (ref 0–0.07)
IMMATURE GRANULOCYTES: 0 %
LDL CHOLESTEROL CALCULATED: 76 MG/DL
LYMPHOCYTES # BLD: 42.8 %
LYMPHOCYTES ABSOLUTE: 1.7 THOU/MM3 (ref 1–4.8)
MCH RBC QN AUTO: 28.5 PG (ref 26–33)
MCHC RBC AUTO-ENTMCNC: 32 GM/DL (ref 32.2–35.5)
MCV RBC AUTO: 89 FL (ref 81–99)
MONOCYTES # BLD: 7.5 %
MONOCYTES ABSOLUTE: 0.3 THOU/MM3 (ref 0.4–1.3)
NUCLEATED RED BLOOD CELLS: 0 /100 WBC
PLATELET # BLD: 172 THOU/MM3 (ref 130–400)
PMV BLD AUTO: 12.3 FL (ref 9.4–12.4)
POTASSIUM SERPL-SCNC: 4.4 MEQ/L (ref 3.5–5.2)
RBC # BLD: 4.46 MILL/MM3 (ref 4.2–5.4)
SEG NEUTROPHILS: 46.7 %
SEGMENTED NEUTROPHILS ABSOLUTE COUNT: 1.9 THOU/MM3 (ref 1.8–7.7)
SODIUM BLD-SCNC: 140 MEQ/L (ref 135–145)
T4 FREE: 1.52 NG/DL (ref 0.93–1.76)
TOTAL PROTEIN: 6.5 G/DL (ref 6.1–8)
TRIGL SERPL-MCNC: 69 MG/DL (ref 0–199)
TSH SERPL DL<=0.05 MIU/L-ACNC: 1.58 UIU/ML (ref 0.4–4.2)
WBC # BLD: 4 THOU/MM3 (ref 4.8–10.8)

## 2021-04-09 PROCEDURE — 84443 ASSAY THYROID STIM HORMONE: CPT

## 2021-04-09 PROCEDURE — 80053 COMPREHEN METABOLIC PANEL: CPT

## 2021-04-09 PROCEDURE — 80061 LIPID PANEL: CPT

## 2021-04-09 PROCEDURE — 84439 ASSAY OF FREE THYROXINE: CPT

## 2021-04-09 PROCEDURE — 85025 COMPLETE CBC W/AUTO DIFF WBC: CPT

## 2021-04-09 PROCEDURE — 83036 HEMOGLOBIN GLYCOSYLATED A1C: CPT

## 2021-04-09 PROCEDURE — 36415 COLL VENOUS BLD VENIPUNCTURE: CPT

## 2021-04-10 LAB
AVERAGE GLUCOSE: 87 MG/DL (ref 70–126)
HBA1C MFR BLD: 4.9 % (ref 4.4–6.4)

## 2021-08-06 LAB
CHOLESTEROL, TOTAL: 164 MG/DL (ref 0–199)
FASTING: YES
GLUCOSE BLD-MCNC: 91 MG/DL (ref 74–109)
HDLC SERPL-MCNC: 52 MG/DL (ref 40–90)
LDL CHOLESTEROL CALCULATED: 95 MG/DL
TRIGL SERPL-MCNC: 87 MG/DL (ref 0–199)

## 2021-09-13 RX ORDER — METOPROLOL SUCCINATE 25 MG/1
TABLET, EXTENDED RELEASE ORAL
Qty: 90 TABLET | Refills: 0 | Status: SHIPPED | OUTPATIENT
Start: 2021-09-13 | End: 2021-11-24

## 2021-09-24 ENCOUNTER — HOSPITAL ENCOUNTER (OUTPATIENT)
Dept: MAMMOGRAPHY | Age: 61
Discharge: HOME | End: 2021-09-24
Payer: COMMERCIAL

## 2021-09-24 DIAGNOSIS — Z12.31 VISIT FOR SCREENING MAMMOGRAM: ICD-10-CM

## 2021-09-24 PROCEDURE — 77063 BREAST TOMOSYNTHESIS BI: CPT

## 2021-11-24 ENCOUNTER — OFFICE VISIT (OUTPATIENT)
Dept: CARDIOLOGY CLINIC | Age: 61
End: 2021-11-24
Payer: COMMERCIAL

## 2021-11-24 VITALS
WEIGHT: 144 LBS | SYSTOLIC BLOOD PRESSURE: 138 MMHG | BODY MASS INDEX: 24.59 KG/M2 | HEART RATE: 69 BPM | DIASTOLIC BLOOD PRESSURE: 82 MMHG | HEIGHT: 64 IN

## 2021-11-24 DIAGNOSIS — R06.02 SOB (SHORTNESS OF BREATH): Primary | ICD-10-CM

## 2021-11-24 DIAGNOSIS — I47.1 SVT (SUPRAVENTRICULAR TACHYCARDIA) (HCC): ICD-10-CM

## 2021-11-24 PROCEDURE — 99214 OFFICE O/P EST MOD 30 MIN: CPT | Performed by: NUCLEAR MEDICINE

## 2021-11-24 PROCEDURE — 93000 ELECTROCARDIOGRAM COMPLETE: CPT | Performed by: NUCLEAR MEDICINE

## 2021-11-24 RX ORDER — METOPROLOL SUCCINATE 50 MG/1
50 TABLET, EXTENDED RELEASE ORAL DAILY
COMMUNITY
End: 2021-11-24 | Stop reason: SDUPTHER

## 2021-11-24 RX ORDER — METOPROLOL SUCCINATE 50 MG/1
50 TABLET, EXTENDED RELEASE ORAL DAILY
Qty: 90 TABLET | Refills: 3 | Status: SHIPPED | OUTPATIENT
Start: 2021-11-24

## 2021-11-24 NOTE — PROGRESS NOTES
[address] [facility] [address]  [facility] [place] [address]  Dept: [phone]  Dept Fax: [phone]  Loc: [phone]    Visit Date: 11/24/2021    Alexi Schmidt is a 62 y.o. female who presents todayfor:  Chief Complaint   Patient presents with    Check-Up    Palpitations     Intermittent SVT   Some more palpitation   Every week few times  Lasting several minutes  No ER visits  Bp is stable  Higher at times  No dizziness  No syncope  Some more dyspnea  No chest pain per se        HPI:  HPI  Past Medical History:   Diagnosis Date    Arthritis     Hypothyroid     IBS (irritable bowel syndrome)       Past Surgical History:   Procedure Laterality Date    ENDOMETRIAL ABLATION      [place] BIOPSY OF BREAST, NEEDLE CORE Right 2000    [facility] at [facility] [id number]    done at [facility]     Family History   Problem Relation Age of Onset    High Cholesterol Mother     Heart Disease Mother     High Cholesterol Father     Stroke Father      Social History     Tobacco Use    Smoking status: Never Smoker    Smokeless tobacco: Never Used   Substance Use Topics    Alcohol use: Yes      Current Outpatient Medications   Medication Sig Dispense Refill    metoprolol succinate (TOPROL XL) 25 MG extended release tablet TAKE 1 TABLET BY MOUTH ONE TIME A DAY 90 tablet 0    pantoprazole (PROTONIX) 40 MG tablet Take 40 mg by mouth daily       ALPRAZolam (XANAX) 0.5 MG tablet Take 0.5 mg by mouth 2 times daily as needed for Anxiety.       buPROPion (WELLBUTRIN SR) 150 MG extended release tablet Take 150 mg by mouth 2 times daily      levothyroxine (SYNTHROID) 50 MCG tablet Take 50 mcg by mouth Daily      colestipol (COLESTID) 1 G tablet Take 2 g by mouth daily       vitamin E 400 UNIT capsule Take 400 Units by mouth daily      NONFORMULARY multivit      Calcium Carbonate (CALCIUM 600 PO) Take by mouth      NONFORMULARY Vit d 5000 units      MAGNESIUM GLYCINATE PLUS PO Take 100 mg by mouth      traZODone (DESYREL) 50 MG tablet Take 25 mg by mouth nightly      aspirin 81 MG tablet Take 81 mg by mouth daily.  montelukast (SINGULAIR) 10 MG tablet Take 10 mg by mouth nightly.  cetirizine (ZYRTEC) 10 MG tablet Take 10 mg by mouth nightly.  Clidinium-Chlordiazepoxide (LIBRAX PO) Take 1 capsule by mouth 4 times daily as needed.  azelastine (ASTELIN) 137 MCG/SPRAY nasal spray 2 sprays by Nasal route nightly. Use in each nostril as directed       No current facility-administered medications for this visit. Allergies   Allergen Reactions    Cleocin [Clindamycin] Diarrhea     Health Maintenance   Topic Date Due    COVID-19 Vaccine (1) Never done    Cervical cancer screen  Never done    Colon cancer screen colonoscopy  Never done    Shingles Vaccine (1 of 2) Never done    Flu vaccine (1) Never done    Breast cancer screen  09/24/2023    Lipid screen  08/06/2026    DTaP/Tdap/Td vaccine (2 - Td or Tdap) 11/09/2029    Hepatitis C screen  Completed    HIV screen  Completed    Hepatitis A vaccine  Aged Out    Hepatitis B vaccine  Aged Out    Hib vaccine  Aged Out    Meningococcal (ACWY) vaccine  Aged Out    Pneumococcal 0-64 years Vaccine  Aged Out       Subjective:  Review of Systems  General:   No fever, no chills, No fatigue or weight loss  Pulmonary:    some more dyspnea, no wheezing  Cardiac:    Denies recent chest pain,   GI:     No nausea or vomiting, no abdominal pain  Neuro:    No dizziness or light headedness,   Musculoskeletal:  No recent active issues  Extremities:   No edema, no obvious claudication         Objective:  Physical Exam  /82   Pulse 69   Ht 5' 4\" (1.626 m)   Wt 144 lb (65.3 kg)   BMI 24.72 kg/m²   General:   Well developed, well nourished  Lungs:   Clear to auscultation  Heart:    Normal S1 S2, Slight murmur.  no rubs, no gallops  Abdomen:   Soft, non tender, no organomegalies, positive bowel sounds  Extremities:   No edema, no cyanosis, good peripheral pulses  Neurological:   Awake, alert, oriented. No obvious focal deficits  Musculoskelatal:  No obvious deformities    Assessment:      Diagnosis Orders   1. SOB (shortness of breath)  EKG 12 lead   2. SVT (supraventricular tachycardia) (HCC)     as above  More SVT symptoms  Some dyspnea  Risk for CAD  ECG in office was done today. I reviewed the ECG. No acute findings      Plan:  No follow-ups on file. Discussed  Non invasive cardiac testing will be ordered to further evaluate for any ischemic or structural heart disease as a cause of the patient symptoms. We will proceed with a Stress Cardiolite test and echo soon. Increase toprol   Continue risk factor modification and medical management  Thank you for allowing me to participate in the care of your patient. Please don't hesitate to contact me regarding any further issues related to the patient care      Orders Placed:  Orders Placed This Encounter   Procedures    EKG 12 lead     Order Specific Question:   Reason for Exam?     Answer: Other       Medications Prescribed:  No orders of the defined types were placed in this encounter. Discussed use, benefit, and side effects of prescribed medications. All patient questions answered. Pt voicedunderstanding. Instructed to continue current medications, diet and exercise. Continue risk factor modification and medical management. Patient agreed with treatment plan. Follow up as directed.     Electronically signedby Blanche Sood MD on 11/24/2021 at 10:39 AM

## 2021-11-24 NOTE — PATIENT INSTRUCTIONS
Increase Metoprolol from 25mg to 50mg once per day--it is okay to take (2) 25mg tablets to equal 50mg until you run out.

## 2021-12-02 ENCOUNTER — HOSPITAL ENCOUNTER (OUTPATIENT)
Dept: NON INVASIVE DIAGNOSTICS | Age: 61
Discharge: HOME | End: 2021-12-02
Payer: COMMERCIAL

## 2021-12-02 DIAGNOSIS — I47.1 SVT (SUPRAVENTRICULAR TACHYCARDIA) (HCC): ICD-10-CM

## 2021-12-02 DIAGNOSIS — R06.02 SOB (SHORTNESS OF BREATH): ICD-10-CM

## 2021-12-02 LAB
LV EF: 55 %
LV EF: 63 %
LVEF MODALITY: NORMAL
LVEF MODALITY: NORMAL

## 2021-12-02 PROCEDURE — 93017 CV STRESS TEST TRACING ONLY: CPT | Performed by: NUCLEAR MEDICINE

## 2021-12-02 PROCEDURE — 3430000000 HC RX DIAGNOSTIC RADIOPHARMACEUTICAL: Performed by: NUCLEAR MEDICINE

## 2021-12-02 PROCEDURE — A9500 TC99M SESTAMIBI: HCPCS | Performed by: NUCLEAR MEDICINE

## 2021-12-02 PROCEDURE — 78452 HT MUSCLE IMAGE SPECT MULT: CPT

## 2021-12-02 PROCEDURE — 93306 TTE W/DOPPLER COMPLETE: CPT

## 2021-12-02 RX ADMIN — Medication 32 MILLICURIE: at 11:04

## 2021-12-02 RX ADMIN — Medication 9.4 MILLICURIE: at 10:10

## 2022-02-04 ENCOUNTER — HOSPITAL ENCOUNTER (OUTPATIENT)
Dept: GENERAL RADIOLOGY | Age: 62
Discharge: HOME | End: 2022-02-04
Payer: COMMERCIAL

## 2022-02-04 ENCOUNTER — HOSPITAL ENCOUNTER (OUTPATIENT)
Age: 62
Discharge: HOME | End: 2022-02-04
Payer: COMMERCIAL

## 2022-02-04 DIAGNOSIS — M25.542 PAIN, JOINT, HAND, LEFT: ICD-10-CM

## 2022-02-04 LAB
ALBUMIN SERPL-MCNC: 4.3 G/DL (ref 3.5–5.1)
ALP BLD-CCNC: 95 U/L (ref 38–126)
ALT SERPL-CCNC: 14 U/L (ref 11–66)
ANION GAP SERPL CALCULATED.3IONS-SCNC: 9 MEQ/L (ref 8–16)
AST SERPL-CCNC: 19 U/L (ref 5–40)
BILIRUB SERPL-MCNC: 0.4 MG/DL (ref 0.3–1.2)
BUN BLDV-MCNC: 9 MG/DL (ref 7–22)
CALCIUM SERPL-MCNC: 9.4 MG/DL (ref 8.5–10.5)
CHLORIDE BLD-SCNC: 106 MEQ/L (ref 98–111)
CO2: 27 MEQ/L (ref 23–33)
CREAT SERPL-MCNC: 0.9 MG/DL (ref 0.4–1.2)
GFR SERPL CREATININE-BSD FRML MDRD: 64 ML/MIN/1.73M2
GLUCOSE BLD-MCNC: 86 MG/DL (ref 70–108)
POTASSIUM SERPL-SCNC: 4.4 MEQ/L (ref 3.5–5.2)
SODIUM BLD-SCNC: 142 MEQ/L (ref 135–145)
TOTAL PROTEIN: 6.1 G/DL (ref 6.1–8)

## 2022-02-04 PROCEDURE — 73130 X-RAY EXAM OF HAND: CPT

## 2022-02-04 PROCEDURE — 36415 COLL VENOUS BLD VENIPUNCTURE: CPT

## 2022-02-04 PROCEDURE — 80053 COMPREHEN METABOLIC PANEL: CPT

## 2022-02-04 PROCEDURE — 73110 X-RAY EXAM OF WRIST: CPT

## 2022-03-23 ENCOUNTER — HOSPITAL ENCOUNTER (OUTPATIENT)
Age: 62
Discharge: HOME | End: 2022-03-23
Payer: COMMERCIAL

## 2022-03-23 LAB
ALBUMIN SERPL-MCNC: 4.3 G/DL (ref 3.5–5.1)
ALP BLD-CCNC: 93 U/L (ref 38–126)
ALT SERPL-CCNC: 14 U/L (ref 11–66)
ANION GAP SERPL CALCULATED.3IONS-SCNC: 8 MEQ/L (ref 8–16)
AST SERPL-CCNC: 19 U/L (ref 5–40)
BASOPHILS # BLD: 1.2 %
BASOPHILS ABSOLUTE: 0.1 THOU/MM3 (ref 0–0.1)
BILIRUB SERPL-MCNC: 0.2 MG/DL (ref 0.3–1.2)
BUN BLDV-MCNC: 9 MG/DL (ref 7–22)
CALCIUM SERPL-MCNC: 9.4 MG/DL (ref 8.5–10.5)
CHLORIDE BLD-SCNC: 105 MEQ/L (ref 98–111)
CHOLESTEROL, TOTAL: 160 MG/DL (ref 100–199)
CO2: 27 MEQ/L (ref 23–33)
CREAT SERPL-MCNC: 0.8 MG/DL (ref 0.4–1.2)
EOSINOPHIL # BLD: 4.9 %
EOSINOPHILS ABSOLUTE: 0.2 THOU/MM3 (ref 0–0.4)
ERYTHROCYTE [DISTWIDTH] IN BLOOD BY AUTOMATED COUNT: 13.1 % (ref 11.5–14.5)
ERYTHROCYTE [DISTWIDTH] IN BLOOD BY AUTOMATED COUNT: 43.2 FL (ref 35–45)
GFR SERPL CREATININE-BSD FRML MDRD: 73 ML/MIN/1.73M2
GLUCOSE BLD-MCNC: 91 MG/DL (ref 70–108)
HCT VFR BLD CALC: 39.7 % (ref 37–47)
HDLC SERPL-MCNC: 52 MG/DL
HEMOGLOBIN: 12.9 GM/DL (ref 12–16)
IMMATURE GRANS (ABS): 0.01 THOU/MM3 (ref 0–0.07)
IMMATURE GRANULOCYTES: 0.2 %
LDL CHOLESTEROL CALCULATED: 90 MG/DL
LYMPHOCYTES # BLD: 40.9 %
LYMPHOCYTES ABSOLUTE: 2 THOU/MM3 (ref 1–4.8)
MCH RBC QN AUTO: 29.4 PG (ref 26–33)
MCHC RBC AUTO-ENTMCNC: 32.5 GM/DL (ref 32.2–35.5)
MCV RBC AUTO: 90.4 FL (ref 81–99)
MONOCYTES # BLD: 8.1 %
MONOCYTES ABSOLUTE: 0.4 THOU/MM3 (ref 0.4–1.3)
NUCLEATED RED BLOOD CELLS: 0 /100 WBC
PLATELET # BLD: 200 THOU/MM3 (ref 130–400)
PMV BLD AUTO: 11.7 FL (ref 9.4–12.4)
POTASSIUM SERPL-SCNC: 4.4 MEQ/L (ref 3.5–5.2)
RBC # BLD: 4.39 MILL/MM3 (ref 4.2–5.4)
SEG NEUTROPHILS: 44.7 %
SEGMENTED NEUTROPHILS ABSOLUTE COUNT: 2.2 THOU/MM3 (ref 1.8–7.7)
SODIUM BLD-SCNC: 140 MEQ/L (ref 135–145)
T4 FREE: 1.52 NG/DL (ref 0.93–1.76)
TOTAL PROTEIN: 6.1 G/DL (ref 6.1–8)
TRIGL SERPL-MCNC: 91 MG/DL (ref 0–199)
TSH SERPL DL<=0.05 MIU/L-ACNC: 1.98 UIU/ML (ref 0.4–4.2)
WBC # BLD: 4.9 THOU/MM3 (ref 4.8–10.8)

## 2022-03-23 PROCEDURE — 84439 ASSAY OF FREE THYROXINE: CPT

## 2022-03-23 PROCEDURE — 36415 COLL VENOUS BLD VENIPUNCTURE: CPT

## 2022-03-23 PROCEDURE — 80053 COMPREHEN METABOLIC PANEL: CPT

## 2022-03-23 PROCEDURE — 80061 LIPID PANEL: CPT

## 2022-03-23 PROCEDURE — 84443 ASSAY THYROID STIM HORMONE: CPT

## 2022-03-23 PROCEDURE — 85025 COMPLETE CBC W/AUTO DIFF WBC: CPT

## 2022-03-23 PROCEDURE — 83036 HEMOGLOBIN GLYCOSYLATED A1C: CPT

## 2022-03-23 PROCEDURE — 84480 ASSAY TRIIODOTHYRONINE (T3): CPT

## 2022-03-24 LAB
AVERAGE GLUCOSE: 75 MG/DL (ref 70–126)
HBA1C MFR BLD: 4.5 % (ref 4.4–6.4)
T3 TOTAL: 87 NG/DL (ref 60–181)

## 2022-05-19 ENCOUNTER — HOSPITAL ENCOUNTER (OUTPATIENT)
Age: 62
Discharge: HOME | End: 2022-05-19
Payer: COMMERCIAL

## 2022-05-19 PROCEDURE — 36415 COLL VENOUS BLD VENIPUNCTURE: CPT

## 2022-05-19 PROCEDURE — 82550 ASSAY OF CK (CPK): CPT

## 2022-05-19 PROCEDURE — 84443 ASSAY THYROID STIM HORMONE: CPT

## 2022-05-19 PROCEDURE — 80053 COMPREHEN METABOLIC PANEL: CPT

## 2022-05-19 PROCEDURE — 84439 ASSAY OF FREE THYROXINE: CPT

## 2022-05-19 PROCEDURE — 86430 RHEUMATOID FACTOR TEST QUAL: CPT

## 2022-05-19 PROCEDURE — 84550 ASSAY OF BLOOD/URIC ACID: CPT

## 2022-05-19 PROCEDURE — 85651 RBC SED RATE NONAUTOMATED: CPT

## 2022-05-19 PROCEDURE — 86140 C-REACTIVE PROTEIN: CPT

## 2022-05-19 PROCEDURE — 86038 ANTINUCLEAR ANTIBODIES: CPT

## 2022-05-20 LAB
ALBUMIN SERPL-MCNC: 4.5 G/DL (ref 3.5–5.1)
ALP BLD-CCNC: 90 U/L (ref 38–126)
ALT SERPL-CCNC: 16 U/L (ref 11–66)
ANION GAP SERPL CALCULATED.3IONS-SCNC: 12 MEQ/L (ref 8–16)
AST SERPL-CCNC: 20 U/L (ref 5–40)
BILIRUB SERPL-MCNC: 0.3 MG/DL (ref 0.3–1.2)
BUN BLDV-MCNC: 11 MG/DL (ref 7–22)
C-REACTIVE PROTEIN: < 0.3 MG/DL (ref 0–1)
CALCIUM SERPL-MCNC: 9.2 MG/DL (ref 8.5–10.5)
CHLORIDE BLD-SCNC: 106 MEQ/L (ref 98–111)
CO2: 24 MEQ/L (ref 23–33)
CREAT SERPL-MCNC: 0.8 MG/DL (ref 0.4–1.2)
GFR SERPL CREATININE-BSD FRML MDRD: 73 ML/MIN/1.73M2
GLUCOSE BLD-MCNC: 83 MG/DL (ref 70–108)
POTASSIUM SERPL-SCNC: 4.3 MEQ/L (ref 3.5–5.2)
RHEUMATOID FACTOR: < 10 IU/ML (ref 0–13)
SEDIMENTATION RATE, ERYTHROCYTE: 1 MM/HR (ref 0–20)
SODIUM BLD-SCNC: 142 MEQ/L (ref 135–145)
T4 FREE: 1.43 NG/DL (ref 0.93–1.76)
TOTAL CK: 74 U/L (ref 30–135)
TOTAL PROTEIN: 5.8 G/DL (ref 6.1–8)
TSH SERPL DL<=0.05 MIU/L-ACNC: 3.02 UIU/ML (ref 0.4–4.2)
URIC ACID: 3 MG/DL (ref 2.4–5.7)

## 2022-05-22 LAB — ANA SCREEN: NORMAL

## 2022-05-24 LAB — PATHOLOGY REPORT: NORMAL

## 2022-08-08 ENCOUNTER — HOSPITAL ENCOUNTER (OUTPATIENT)
Age: 62
Discharge: HOME | End: 2022-08-08
Payer: COMMERCIAL

## 2022-08-08 PROCEDURE — 36415 COLL VENOUS BLD VENIPUNCTURE: CPT

## 2022-08-08 PROCEDURE — 86003 ALLG SPEC IGE CRUDE XTRC EA: CPT

## 2022-08-12 LAB
ALLERGEN BARLEY IGE: 0.4 KU/L
ALLERGEN BEEF: < 0.1 KU/L
ALLERGEN CABBAGE IGE: 0.16 KU/L
ALLERGEN CARROT IGE: < 0.1 KU/L
ALLERGEN CHICKEN IGE: < 0.1 KU/L
ALLERGEN CODFISH IGE: < 0.1 KU/L
ALLERGEN CORN IGE: 0.21 KU/L
ALLERGEN CRAB IGE: < 0.1 KU/L
ALLERGEN EGG WHITE IGE: < 0.1 KU/L
ALLERGEN GRAPE IGE: < 0.1 KU/L
ALLERGEN INTERPRETATION/SCORE: ABNORMAL
ALLERGEN LETTUCE IGE: < 0.1 KU/L
ALLERGEN MILK IGE: < 0.1 KU/L
ALLERGEN NAVY BEAN: < 0.1 KU/L
ALLERGEN OAT: 0.2 KU/L
ALLERGEN ORANGE IGE: < 0.1 KU/L
ALLERGEN PEPPER C. ANNUUM IGE: < 0.1 KU/L
ALLERGEN PORK: < 0.1 KU/L
ALLERGEN POTATO IGE: < 0.1 KU/L
ALLERGEN RICE IGE: 0.13 KU/L
ALLERGEN RYE IGE: 0.32 KU/L
ALLERGEN SHRIMP IGE: < 0.1 KU/L
ALLERGEN SOYBEAN IGE: < 0.1 KU/L
ALLERGEN TOMATO IGE: 0.19 KU/L
ALLERGEN TUNA IGE: < 0.1 KU/L
ALLERGEN WHEAT IGE: 0.37 KU/L

## 2022-11-08 RX ORDER — METOPROLOL SUCCINATE 50 MG/1
TABLET, EXTENDED RELEASE ORAL
Qty: 90 TABLET | Refills: 0 | Status: SHIPPED | OUTPATIENT
Start: 2022-11-08 | End: 2022-11-28 | Stop reason: SDUPTHER

## 2022-11-16 ENCOUNTER — HOSPITAL ENCOUNTER (OUTPATIENT)
Dept: MAMMOGRAPHY | Age: 62
Discharge: HOME | End: 2022-11-16
Payer: COMMERCIAL

## 2022-11-16 DIAGNOSIS — Z12.31 VISIT FOR SCREENING MAMMOGRAM: ICD-10-CM

## 2022-11-16 PROCEDURE — 77063 BREAST TOMOSYNTHESIS BI: CPT

## 2022-11-28 ENCOUNTER — OFFICE VISIT (OUTPATIENT)
Dept: CARDIOLOGY CLINIC | Age: 62
End: 2022-11-28
Payer: COMMERCIAL

## 2022-11-28 VITALS
HEIGHT: 64 IN | HEART RATE: 73 BPM | BODY MASS INDEX: 25.27 KG/M2 | WEIGHT: 148 LBS | DIASTOLIC BLOOD PRESSURE: 92 MMHG | SYSTOLIC BLOOD PRESSURE: 152 MMHG

## 2022-11-28 DIAGNOSIS — I10 PRIMARY HYPERTENSION: ICD-10-CM

## 2022-11-28 DIAGNOSIS — I47.1 SVT (SUPRAVENTRICULAR TACHYCARDIA) (HCC): Primary | ICD-10-CM

## 2022-11-28 PROCEDURE — 3074F SYST BP LT 130 MM HG: CPT | Performed by: NUCLEAR MEDICINE

## 2022-11-28 PROCEDURE — 93000 ELECTROCARDIOGRAM COMPLETE: CPT | Performed by: NUCLEAR MEDICINE

## 2022-11-28 PROCEDURE — 99213 OFFICE O/P EST LOW 20 MIN: CPT | Performed by: NUCLEAR MEDICINE

## 2022-11-28 PROCEDURE — 3078F DIAST BP <80 MM HG: CPT | Performed by: NUCLEAR MEDICINE

## 2022-11-28 RX ORDER — METOPROLOL SUCCINATE 50 MG/1
TABLET, EXTENDED RELEASE ORAL
Qty: 90 TABLET | Refills: 3 | Status: SHIPPED | OUTPATIENT
Start: 2022-11-28

## 2022-11-28 NOTE — PROGRESS NOTES
Patient here for check up. EKG done today. Patient complains of chest pain and palpitations. Patient states BP was 143/80 at PCP office last week.

## 2022-11-28 NOTE — PROGRESS NOTES
[id number]  [address]  [facility] [address]  Dept: [phone]  Dept Fax: [phone]  Loc: [phone]    Visit Date: 11/28/2022    Lucas Bishop is a 65 y.o. female who presents todayfor:  Chief Complaint   Patient presents with    Check-Up    Hypertension    Palpitations     Known SVT   Seems under control   Minimal episodes  Not lasting long   No chest pain   No changes in breathing  BP is stable      HPI:  HPI  Past Medical History:   Diagnosis Date    Arthritis     Hypothyroid     IBS (irritable bowel syndrome)       Past Surgical History:   Procedure Laterality Date    ENDOMETRIAL ABLATION      ID BIOPSY OF BREAST, NEEDLE CORE Right 2000    done at [place] [address] Left [id number]    done at [facility]     Family History   Problem Relation Age of Onset    High Cholesterol Mother     Heart Disease Mother     High Cholesterol Father     Stroke Father      Social History     Tobacco Use    Smoking status: Never    Smokeless tobacco: Never   Substance Use Topics    Alcohol use: Yes      Current Outpatient Medications   Medication Sig Dispense Refill    metoprolol succinate (TOPROL XL) 50 MG extended release tablet TAKE 1 TABLET BY MOUTH ONCE DAILY 90 tablet 0    pantoprazole (PROTONIX) 40 MG tablet Take 20 mg by mouth daily      ALPRAZolam (XANAX) 0.5 MG tablet Take 0.5 mg by mouth 2 times daily as needed for Anxiety.       buPROPion (WELLBUTRIN SR) 150 MG extended release tablet Take 150 mg by mouth 2 times daily      levothyroxine (SYNTHROID) 50 MCG tablet Take 50 mcg by mouth Daily      colestipol (COLESTID) 1 G tablet Take 2 g by mouth daily       vitamin E 400 UNIT capsule Take 400 Units by mouth daily      NONFORMULARY multivit      Calcium Carbonate (CALCIUM 600 PO) Take by mouth      NONFORMULARY Vit d 5000 units      MAGNESIUM GLYCINATE PLUS PO Take 100 mg by mouth      traZODone (DESYREL) 50 MG tablet Take 25 mg by mouth nightly      aspirin 81 MG tablet Take 81 mg by mouth daily. montelukast (SINGULAIR) 10 MG tablet Take 10 mg by mouth nightly. cetirizine (ZYRTEC) 10 MG tablet Take 10 mg by mouth nightly. Clidinium-Chlordiazepoxide (LIBRAX PO) Take 1 capsule by mouth 4 times daily as needed. azelastine (ASTELIN) 137 MCG/SPRAY nasal spray 2 sprays by Nasal route nightly. Use in each nostril as directed       No current facility-administered medications for this visit. Allergies   Allergen Reactions    Cleocin [Clindamycin] Diarrhea     Health Maintenance   Topic Date Due    COVID-19 Vaccine (1) Never done    Depression Screen  Never done    Colorectal Cancer Screen  Never done    Shingles vaccine (1 of 2) Never done    Flu vaccine (1) Never done    Breast cancer screen  11/16/2024    Cervical cancer screen  08/19/2025    Lipids  03/23/2027    DTaP/Tdap/Td vaccine (2 - Td or Tdap) 11/09/2029    Hepatitis C screen  Completed    HIV screen  Completed    Hepatitis A vaccine  Aged Out    Hib vaccine  Aged Out    Meningococcal (ACWY) vaccine  Aged Out    Pneumococcal 0-64 years Vaccine  Aged Out       Subjective:  Review of Systems  General:   No fever, no chills, No fatigue or weight loss  Pulmonary:    No dyspnea, no wheezing  Cardiac:    Denies recent chest pain,   GI:     No nausea or vomiting, no abdominal pain  Neuro:    No dizziness or light headedness,   Musculoskeletal:  No recent active issues  Extremities:   No edema, no obvious claudication     Objective:  Physical Exam  BP (!) 152/92   Pulse 73   Ht 5' 3.5\" (1.613 m)   Wt 148 lb (67.1 kg)   BMI 25.81 kg/m²   General:   Well developed, well nourished  Lungs:   Clear to auscultation  Heart:    Normal S1 S2, Slight murmur.  no rubs, no gallops  Abdomen:   Soft, non tender, no organomegalies, positive bowel sounds  Extremities:   No edema, no cyanosis, good peripheral pulses  Neurological:   Awake, alert, oriented. No obvious focal deficits  Musculoskelatal:  No obvious deformities    Assessment:      Diagnosis Orders   1. SVT (supraventricular tachycardia) (HCC)  EKG 12 Lead      2. Primary hypertension        As above  Cardiac fair for now  ECG in office was done today. I reviewed the ECG. No acute findings      Plan:  No follow-ups on file. As above  Continue risk factor modification and medical management    'Thank you for allowing me to participate in the care of your patient. Please don't hesitate to contact me regarding any further issues related to the patient care    Orders Placed:  Orders Placed This Encounter   Procedures    EKG 12 Lead     Order Specific Question:   Reason for Exam?     Answer: Other       Medications Prescribed:  No orders of the defined types were placed in this encounter. Discussed use, benefit, and side effects of prescribed medications. All patient questions answered. Pt voicedunderstanding. Instructed to continue current medications, diet and exercise. Continue risk factor modification and medical management. Patient agreed with treatment plan. Follow up as directed.     Electronically signedby Susan Barry MD on 11/28/2022 at 10:48 AM

## 2022-12-21 ENCOUNTER — APPOINTMENT (OUTPATIENT)
Dept: CT IMAGING | Age: 62
End: 2022-12-21
Payer: COMMERCIAL

## 2022-12-21 ENCOUNTER — APPOINTMENT (OUTPATIENT)
Dept: INTERVENTIONAL RADIOLOGY/VASCULAR | Age: 62
End: 2022-12-21
Payer: COMMERCIAL

## 2022-12-21 ENCOUNTER — HOSPITAL ENCOUNTER (INPATIENT)
Age: 62
LOS: 3 days | Discharge: HOME | End: 2022-12-24
Payer: COMMERCIAL

## 2022-12-21 ENCOUNTER — NURSE TRIAGE (OUTPATIENT)
Dept: OTHER | Facility: CLINIC | Age: 62
End: 2022-12-21

## 2022-12-21 DIAGNOSIS — R58 INTRA ABDOMINAL HEMORRHAGE: Primary | ICD-10-CM

## 2022-12-21 PROBLEM — R10.9 ABDOMINAL PAIN: Status: ACTIVE | Noted: 2022-12-21

## 2022-12-21 LAB
ALBUMIN SERPL-MCNC: 3.9 G/DL (ref 3.5–5.1)
ALP BLD-CCNC: 106 U/L (ref 38–126)
ALT SERPL-CCNC: 17 U/L (ref 11–66)
ANION GAP SERPL CALCULATED.3IONS-SCNC: 11 MEQ/L (ref 8–16)
AST SERPL-CCNC: 18 U/L (ref 5–40)
BASOPHILS # BLD: 0.4 %
BASOPHILS ABSOLUTE: 0 THOU/MM3 (ref 0–0.1)
BILIRUB SERPL-MCNC: 0.4 MG/DL (ref 0.3–1.2)
BILIRUBIN DIRECT: < 0.2 MG/DL (ref 0–0.3)
BUN BLDV-MCNC: 13 MG/DL (ref 7–22)
CALCIUM SERPL-MCNC: 9.6 MG/DL (ref 8.5–10.5)
CHLORIDE BLD-SCNC: 99 MEQ/L (ref 98–111)
CO2: 24 MEQ/L (ref 23–33)
CREAT SERPL-MCNC: 0.8 MG/DL (ref 0.4–1.2)
EKG ATRIAL RATE: 82 BPM
EKG P AXIS: 69 DEGREES
EKG P-R INTERVAL: 156 MS
EKG Q-T INTERVAL: 378 MS
EKG QRS DURATION: 82 MS
EKG QTC CALCULATION (BAZETT): 441 MS
EKG R AXIS: 70 DEGREES
EKG T AXIS: 48 DEGREES
EKG VENTRICULAR RATE: 82 BPM
EOSINOPHIL # BLD: 0.2 %
EOSINOPHILS ABSOLUTE: 0 THOU/MM3 (ref 0–0.4)
ERYTHROCYTE [DISTWIDTH] IN BLOOD BY AUTOMATED COUNT: 12.6 % (ref 11.5–14.5)
ERYTHROCYTE [DISTWIDTH] IN BLOOD BY AUTOMATED COUNT: 39.3 FL (ref 35–45)
GFR SERPL CREATININE-BSD FRML MDRD: > 60 ML/MIN/1.73M2
GLUCOSE BLD-MCNC: 160 MG/DL (ref 70–108)
HCT VFR BLD CALC: 30.1 % (ref 37–47)
HCT VFR BLD CALC: 32.1 % (ref 37–47)
HCT VFR BLD CALC: 37.2 % (ref 37–47)
HEMOGLOBIN: 10.3 GM/DL (ref 12–16)
HEMOGLOBIN: 10.9 GM/DL (ref 12–16)
HEMOGLOBIN: 12.8 GM/DL (ref 12–16)
IMMATURE GRANS (ABS): 0.05 THOU/MM3 (ref 0–0.07)
IMMATURE GRANULOCYTES: 0.4 %
LACTIC ACID: 1.8 MMOL/L (ref 0.5–2)
LACTIC ACID: 2.2 MMOL/L (ref 0.5–2)
LIPASE: 39.4 U/L (ref 5.6–51.3)
LYMPHOCYTES # BLD: 17.8 %
LYMPHOCYTES ABSOLUTE: 2.2 THOU/MM3 (ref 1–4.8)
MCH RBC QN AUTO: 29.4 PG (ref 26–33)
MCHC RBC AUTO-ENTMCNC: 34.4 GM/DL (ref 32.2–35.5)
MCV RBC AUTO: 85.3 FL (ref 81–99)
MONOCYTES # BLD: 5.4 %
MONOCYTES ABSOLUTE: 0.7 THOU/MM3 (ref 0.4–1.3)
NUCLEATED RED BLOOD CELLS: 0 /100 WBC
OSMOLALITY CALCULATION: 271.8 MOSMOL/KG (ref 275–300)
PLATELET # BLD: 249 THOU/MM3 (ref 130–400)
PMV BLD AUTO: 10.3 FL (ref 9.4–12.4)
POTASSIUM SERPL-SCNC: 4 MEQ/L (ref 3.5–5.2)
RBC # BLD: 4.36 MILL/MM3 (ref 4.2–5.4)
SEG NEUTROPHILS: 75.8 %
SEGMENTED NEUTROPHILS ABSOLUTE COUNT: 9.2 THOU/MM3 (ref 1.8–7.7)
SODIUM BLD-SCNC: 134 MEQ/L (ref 135–145)
TOTAL PROTEIN: 6.6 G/DL (ref 6.1–8)
TROPONIN T: < 0.01 NG/ML
WBC # BLD: 12.1 THOU/MM3 (ref 4.8–10.8)

## 2022-12-21 PROCEDURE — 37244 VASC EMBOLIZE/OCCLUDE BLEED: CPT

## 2022-12-21 PROCEDURE — 2580000003 HC RX 258

## 2022-12-21 PROCEDURE — 85025 COMPLETE CBC W/AUTO DIFF WBC: CPT

## 2022-12-21 PROCEDURE — 93005 ELECTROCARDIOGRAM TRACING: CPT | Performed by: NURSE PRACTITIONER

## 2022-12-21 PROCEDURE — 99285 EMERGENCY DEPT VISIT HI MDM: CPT

## 2022-12-21 PROCEDURE — 83605 ASSAY OF LACTIC ACID: CPT

## 2022-12-21 PROCEDURE — 04V53ZZ RESTRICTION OF SUPERIOR MESENTERIC ARTERY, PERCUTANEOUS APPROACH: ICD-10-PCS | Performed by: RADIOLOGY

## 2022-12-21 PROCEDURE — 96361 HYDRATE IV INFUSION ADD-ON: CPT

## 2022-12-21 PROCEDURE — 6360000004 HC RX CONTRAST MEDICATION: Performed by: NURSE PRACTITIONER

## 2022-12-21 PROCEDURE — 96375 TX/PRO/DX INJ NEW DRUG ADDON: CPT

## 2022-12-21 PROCEDURE — 36415 COLL VENOUS BLD VENIPUNCTURE: CPT

## 2022-12-21 PROCEDURE — 84484 ASSAY OF TROPONIN QUANT: CPT

## 2022-12-21 PROCEDURE — 6360000004 HC RX CONTRAST MEDICATION: Performed by: RADIOLOGY

## 2022-12-21 PROCEDURE — 83690 ASSAY OF LIPASE: CPT

## 2022-12-21 PROCEDURE — 6360000002 HC RX W HCPCS: Performed by: RADIOLOGY

## 2022-12-21 PROCEDURE — 36246 INS CATH ABD/L-EXT ART 2ND: CPT

## 2022-12-21 PROCEDURE — 75710 ARTERY X-RAYS ARM/LEG: CPT

## 2022-12-21 PROCEDURE — B4141ZZ FLUOROSCOPY OF SUPERIOR MESENTERIC ARTERY USING LOW OSMOLAR CONTRAST: ICD-10-PCS | Performed by: RADIOLOGY

## 2022-12-21 PROCEDURE — 96374 THER/PROPH/DIAG INJ IV PUSH: CPT

## 2022-12-21 PROCEDURE — 74177 CT ABD & PELVIS W/CONTRAST: CPT

## 2022-12-21 PROCEDURE — 75774 ARTERY X-RAY EACH VESSEL: CPT

## 2022-12-21 PROCEDURE — 36247 INS CATH ABD/L-EXT ART 3RD: CPT

## 2022-12-21 PROCEDURE — 6360000002 HC RX W HCPCS: Performed by: NURSE PRACTITIONER

## 2022-12-21 PROCEDURE — 75726 ARTERY X-RAYS ABDOMEN: CPT

## 2022-12-21 PROCEDURE — 82248 BILIRUBIN DIRECT: CPT

## 2022-12-21 PROCEDURE — 6370000000 HC RX 637 (ALT 250 FOR IP)

## 2022-12-21 PROCEDURE — 2709999900 IR ANGIOGRAM SUPERIOR MESENTERIC

## 2022-12-21 PROCEDURE — 80053 COMPREHEN METABOLIC PANEL: CPT

## 2022-12-21 PROCEDURE — 04L53ZZ OCCLUSION OF SUPERIOR MESENTERIC ARTERY, PERCUTANEOUS APPROACH: ICD-10-PCS | Performed by: RADIOLOGY

## 2022-12-21 PROCEDURE — 2060000000 HC ICU INTERMEDIATE R&B

## 2022-12-21 PROCEDURE — 85018 HEMOGLOBIN: CPT

## 2022-12-21 PROCEDURE — 2580000003 HC RX 258: Performed by: NURSE PRACTITIONER

## 2022-12-21 PROCEDURE — C9113 INJ PANTOPRAZOLE SODIUM, VIA: HCPCS

## 2022-12-21 PROCEDURE — 6360000002 HC RX W HCPCS

## 2022-12-21 PROCEDURE — 85014 HEMATOCRIT: CPT

## 2022-12-21 RX ORDER — SODIUM CHLORIDE, SODIUM LACTATE, POTASSIUM CHLORIDE, CALCIUM CHLORIDE 600; 310; 30; 20 MG/100ML; MG/100ML; MG/100ML; MG/100ML
INJECTION, SOLUTION INTRAVENOUS CONTINUOUS
Status: ACTIVE | OUTPATIENT
Start: 2022-12-21 | End: 2022-12-23

## 2022-12-21 RX ORDER — ACETAMINOPHEN 650 MG/1
650 SUPPOSITORY RECTAL EVERY 6 HOURS PRN
Status: DISCONTINUED | OUTPATIENT
Start: 2022-12-21 | End: 2022-12-24 | Stop reason: HOSPADM

## 2022-12-21 RX ORDER — SODIUM CHLORIDE, SODIUM LACTATE, POTASSIUM CHLORIDE, CALCIUM CHLORIDE 600; 310; 30; 20 MG/100ML; MG/100ML; MG/100ML; MG/100ML
INJECTION, SOLUTION INTRAVENOUS CONTINUOUS
Status: DISCONTINUED | OUTPATIENT
Start: 2022-12-21 | End: 2022-12-21

## 2022-12-21 RX ORDER — BUPROPION HYDROCHLORIDE 150 MG/1
150 TABLET, EXTENDED RELEASE ORAL 2 TIMES DAILY
Status: DISCONTINUED | OUTPATIENT
Start: 2022-12-21 | End: 2022-12-24 | Stop reason: HOSPADM

## 2022-12-21 RX ORDER — ASPIRIN 81 MG/1
81 TABLET ORAL DAILY
Status: DISCONTINUED | OUTPATIENT
Start: 2022-12-21 | End: 2022-12-24 | Stop reason: HOSPADM

## 2022-12-21 RX ORDER — POTASSIUM CHLORIDE 7.45 MG/ML
10 INJECTION INTRAVENOUS PRN
Status: DISCONTINUED | OUTPATIENT
Start: 2022-12-21 | End: 2022-12-24 | Stop reason: HOSPADM

## 2022-12-21 RX ORDER — HYDROCODONE BITARTRATE AND ACETAMINOPHEN 7.5; 325 MG/1; MG/1
1 TABLET ORAL EVERY 6 HOURS PRN
Status: DISCONTINUED | OUTPATIENT
Start: 2022-12-21 | End: 2022-12-24 | Stop reason: HOSPADM

## 2022-12-21 RX ORDER — ONDANSETRON 2 MG/ML
4 INJECTION INTRAMUSCULAR; INTRAVENOUS EVERY 6 HOURS PRN
Status: DISCONTINUED | OUTPATIENT
Start: 2022-12-21 | End: 2022-12-24 | Stop reason: HOSPADM

## 2022-12-21 RX ORDER — ACETAMINOPHEN 325 MG/1
650 TABLET ORAL EVERY 6 HOURS PRN
Status: DISCONTINUED | OUTPATIENT
Start: 2022-12-21 | End: 2022-12-24 | Stop reason: HOSPADM

## 2022-12-21 RX ORDER — ONDANSETRON 4 MG/1
4 TABLET, ORALLY DISINTEGRATING ORAL EVERY 8 HOURS PRN
Status: DISCONTINUED | OUTPATIENT
Start: 2022-12-21 | End: 2022-12-24 | Stop reason: HOSPADM

## 2022-12-21 RX ORDER — ONDANSETRON 4 MG/1
4 TABLET, ORALLY DISINTEGRATING ORAL EVERY 8 HOURS PRN
Status: DISCONTINUED | OUTPATIENT
Start: 2022-12-21 | End: 2022-12-21 | Stop reason: SDUPTHER

## 2022-12-21 RX ORDER — CHOLESTYRAMINE LIGHT 4 G/5.7G
4 POWDER, FOR SUSPENSION ORAL DAILY
Status: DISCONTINUED | OUTPATIENT
Start: 2022-12-21 | End: 2022-12-21

## 2022-12-21 RX ORDER — HYDRALAZINE HYDROCHLORIDE 20 MG/ML
10 INJECTION INTRAMUSCULAR; INTRAVENOUS EVERY 6 HOURS PRN
Status: DISCONTINUED | OUTPATIENT
Start: 2022-12-21 | End: 2022-12-24 | Stop reason: HOSPADM

## 2022-12-21 RX ORDER — OCTREOTIDE ACETATE 100 UG/ML
100 INJECTION, SOLUTION INTRAVENOUS; SUBCUTANEOUS ONCE
Status: DISCONTINUED | OUTPATIENT
Start: 2022-12-21 | End: 2022-12-21

## 2022-12-21 RX ORDER — MAGNESIUM SULFATE IN WATER 40 MG/ML
2000 INJECTION, SOLUTION INTRAVENOUS PRN
Status: DISCONTINUED | OUTPATIENT
Start: 2022-12-21 | End: 2022-12-24 | Stop reason: HOSPADM

## 2022-12-21 RX ORDER — SODIUM CHLORIDE 9 MG/ML
INJECTION, SOLUTION INTRAVENOUS PRN
Status: DISCONTINUED | OUTPATIENT
Start: 2022-12-21 | End: 2022-12-24 | Stop reason: HOSPADM

## 2022-12-21 RX ORDER — MONTELUKAST SODIUM 10 MG/1
10 TABLET ORAL NIGHTLY
Status: DISCONTINUED | OUTPATIENT
Start: 2022-12-21 | End: 2022-12-24 | Stop reason: HOSPADM

## 2022-12-21 RX ORDER — SODIUM CHLORIDE 0.9 % (FLUSH) 0.9 %
10 SYRINGE (ML) INJECTION EVERY 12 HOURS SCHEDULED
Status: DISCONTINUED | OUTPATIENT
Start: 2022-12-21 | End: 2022-12-24 | Stop reason: HOSPADM

## 2022-12-21 RX ORDER — MIDAZOLAM HYDROCHLORIDE 1 MG/ML
INJECTION INTRAMUSCULAR; INTRAVENOUS
Status: COMPLETED | OUTPATIENT
Start: 2022-12-21 | End: 2022-12-21

## 2022-12-21 RX ORDER — POTASSIUM CHLORIDE 20 MEQ/1
40 TABLET, EXTENDED RELEASE ORAL PRN
Status: DISCONTINUED | OUTPATIENT
Start: 2022-12-21 | End: 2022-12-24 | Stop reason: HOSPADM

## 2022-12-21 RX ORDER — 0.9 % SODIUM CHLORIDE 0.9 %
1000 INTRAVENOUS SOLUTION INTRAVENOUS ONCE
Status: COMPLETED | OUTPATIENT
Start: 2022-12-21 | End: 2022-12-21

## 2022-12-21 RX ORDER — CETIRIZINE HYDROCHLORIDE 10 MG/1
10 TABLET ORAL NIGHTLY
Status: DISCONTINUED | OUTPATIENT
Start: 2022-12-21 | End: 2022-12-24 | Stop reason: HOSPADM

## 2022-12-21 RX ORDER — PROCHLORPERAZINE EDISYLATE 5 MG/ML
10 INJECTION INTRAMUSCULAR; INTRAVENOUS ONCE
Status: COMPLETED | OUTPATIENT
Start: 2022-12-21 | End: 2022-12-21

## 2022-12-21 RX ORDER — KETOROLAC TROMETHAMINE 30 MG/ML
30 INJECTION, SOLUTION INTRAMUSCULAR; INTRAVENOUS ONCE
Status: COMPLETED | OUTPATIENT
Start: 2022-12-21 | End: 2022-12-21

## 2022-12-21 RX ORDER — POLYETHYLENE GLYCOL 3350 17 G/17G
17 POWDER, FOR SOLUTION ORAL DAILY PRN
Status: DISCONTINUED | OUTPATIENT
Start: 2022-12-21 | End: 2022-12-24 | Stop reason: HOSPADM

## 2022-12-21 RX ORDER — SODIUM CHLORIDE 0.9 % (FLUSH) 0.9 %
10 SYRINGE (ML) INJECTION PRN
Status: DISCONTINUED | OUTPATIENT
Start: 2022-12-21 | End: 2022-12-24 | Stop reason: HOSPADM

## 2022-12-21 RX ORDER — METOPROLOL SUCCINATE 50 MG/1
50 TABLET, EXTENDED RELEASE ORAL DAILY
Status: DISCONTINUED | OUTPATIENT
Start: 2022-12-21 | End: 2022-12-24 | Stop reason: HOSPADM

## 2022-12-21 RX ORDER — FENTANYL CITRATE 50 UG/ML
INJECTION, SOLUTION INTRAMUSCULAR; INTRAVENOUS
Status: COMPLETED | OUTPATIENT
Start: 2022-12-21 | End: 2022-12-21

## 2022-12-21 RX ORDER — LEVOTHYROXINE SODIUM 0.05 MG/1
50 TABLET ORAL DAILY
Status: DISCONTINUED | OUTPATIENT
Start: 2022-12-22 | End: 2022-12-24 | Stop reason: HOSPADM

## 2022-12-21 RX ORDER — ONDANSETRON 2 MG/ML
4 INJECTION INTRAMUSCULAR; INTRAVENOUS EVERY 6 HOURS PRN
Status: DISCONTINUED | OUTPATIENT
Start: 2022-12-21 | End: 2022-12-21 | Stop reason: SDUPTHER

## 2022-12-21 RX ORDER — PANTOPRAZOLE SODIUM 40 MG/10ML
40 INJECTION, POWDER, LYOPHILIZED, FOR SOLUTION INTRAVENOUS 2 TIMES DAILY
Status: DISCONTINUED | OUTPATIENT
Start: 2022-12-21 | End: 2022-12-21

## 2022-12-21 RX ORDER — ONDANSETRON 2 MG/ML
4 INJECTION INTRAMUSCULAR; INTRAVENOUS ONCE
Status: COMPLETED | OUTPATIENT
Start: 2022-12-21 | End: 2022-12-21

## 2022-12-21 RX ADMIN — FENTANYL CITRATE 50 MCG: 50 INJECTION, SOLUTION INTRAMUSCULAR; INTRAVENOUS at 13:12

## 2022-12-21 RX ADMIN — IOPAMIDOL 80 ML: 755 INJECTION, SOLUTION INTRAVENOUS at 10:59

## 2022-12-21 RX ADMIN — CETIRIZINE HYDROCHLORIDE 10 MG: 10 TABLET, FILM COATED ORAL at 22:10

## 2022-12-21 RX ADMIN — MONTELUKAST SODIUM 10 MG: 10 TABLET ORAL at 22:10

## 2022-12-21 RX ADMIN — PROCHLORPERAZINE EDISYLATE 10 MG: 5 INJECTION INTRAMUSCULAR; INTRAVENOUS at 11:22

## 2022-12-21 RX ADMIN — ONDANSETRON 4 MG: 2 INJECTION INTRAMUSCULAR; INTRAVENOUS at 09:57

## 2022-12-21 RX ADMIN — SODIUM CHLORIDE 80 MG: 9 INJECTION, SOLUTION INTRAVENOUS at 17:18

## 2022-12-21 RX ADMIN — MIDAZOLAM 1 MG: 1 INJECTION INTRAMUSCULAR; INTRAVENOUS at 13:12

## 2022-12-21 RX ADMIN — SODIUM CHLORIDE, PRESERVATIVE FREE 10 ML: 5 INJECTION INTRAVENOUS at 22:10

## 2022-12-21 RX ADMIN — SODIUM CHLORIDE, POTASSIUM CHLORIDE, SODIUM LACTATE AND CALCIUM CHLORIDE: 600; 310; 30; 20 INJECTION, SOLUTION INTRAVENOUS at 17:13

## 2022-12-21 RX ADMIN — IOPAMIDOL 215 ML: 612 INJECTION, SOLUTION INTRAVENOUS at 15:12

## 2022-12-21 RX ADMIN — KETOROLAC TROMETHAMINE 30 MG: 30 INJECTION, SOLUTION INTRAMUSCULAR; INTRAVENOUS at 09:57

## 2022-12-21 RX ADMIN — SODIUM CHLORIDE 8 MG/HR: 9 INJECTION, SOLUTION INTRAVENOUS at 20:04

## 2022-12-21 RX ADMIN — SODIUM CHLORIDE 1000 ML: 9 INJECTION, SOLUTION INTRAVENOUS at 10:03

## 2022-12-21 RX ADMIN — BUPROPION HYDROCHLORIDE 150 MG: 150 TABLET, EXTENDED RELEASE ORAL at 22:10

## 2022-12-21 ASSESSMENT — ENCOUNTER SYMPTOMS
SORE THROAT: 0
ABDOMINAL PAIN: 1
CHEST TIGHTNESS: 0
COUGH: 0
VOMITING: 1
RHINORRHEA: 0
BACK PAIN: 0
NAUSEA: 1
ABDOMINAL DISTENTION: 0
CONSTIPATION: 0
DIARRHEA: 0
BLOOD IN STOOL: 0
COLOR CHANGE: 0
SHORTNESS OF BREATH: 0
DIARRHEA: 1

## 2022-12-21 ASSESSMENT — PAIN SCALES - GENERAL
PAINLEVEL_OUTOF10: 8
PAINLEVEL_OUTOF10: 2

## 2022-12-21 ASSESSMENT — PAIN - FUNCTIONAL ASSESSMENT: PAIN_FUNCTIONAL_ASSESSMENT: 0-10

## 2022-12-21 NOTE — ED NOTES
ED to inpatient nurses report    Chief Complaint   Patient presents with    Abdominal Pain    Emesis    Diarrhea      Present to ED from home  LOC: alert and orientated to name, place, date  Vital signs   Vitals:    12/21/22 0931 12/21/22 0932 12/21/22 1046   BP: (!) 144/79  (!) 135/56   Pulse: 84  82   Resp: (!) 118  16   Temp:  97.8 °F (36.6 °C)    SpO2: 99%  94%   Weight: 140 lb (63.5 kg)     Height: 5' 3\" (1.6 m)        Oxygen Baseline 98%    Current needs required RA Bipap/Cpap No  LDAs:   Peripheral IV 12/21/22 Left Antecubital (Active)   Site Assessment Clean, dry & intact 12/21/22 0940   Line Status Blood return noted; Flushed;Specimen collected 12/21/22 0940   Phlebitis Assessment No symptoms 12/21/22 0940   Infiltration Assessment 0 12/21/22 0940   Dressing Status Clean, dry & intact 12/21/22 0940     Mobility: Independent  Pending ED orders: Na  Present condition: stable    C-SSRS Risk of Suicide: No Risk  Swallow Screening    Preferred Language: [place]     Electronically signed by Trinidad De Dios RN on 12/21/2022 at 11:55 AM       Trinidad De Dios RN  12/21/22 5694

## 2022-12-21 NOTE — PROGRESS NOTES
1252 Patient received in IR for procedure. 1256 This procedure has been fully reviewed with the patient and written informed consent has been obtained. 1300 Patient prepped for procedure. 1312 Procedure started with Dr. Sharee Pa. 1313 Access obtained in right femoral with use of sonosite. [address] 300-500um injected to duodenal branch of SMA by Dr Sharee Pa. 1509 Procedure completed; patient tolerated well. Angio seal device deployed to right femoral artery. 1511 Gauze and op site to right femoral site; area soft to touch with no bleeding noted. 1 Dr Sharee Pa speaking with Areli SKELTON via phone. 1518 Patient on bed; comfort ensured. Right femoral dressing remains dry and intact with area soft. 1520 Patient taken to 4K via bed. Right femoral dressing remains dry and intact with area soft. 1521 Report called to Robin Briones on 4K.

## 2022-12-21 NOTE — ED PROVIDER NOTES
[facility] Emergency Department    CHIEF COMPLAINT       Chief Complaint   Patient presents with    Abdominal Pain    Emesis    Diarrhea       Nurses Notes reviewed and I agree except as noted in the HPI. HISTORY OF PRESENT ILLNESS    Nayely Esteban is a 65 y.o. female who presents to the ED for evaluation of abdominal pain and emesis, diarrhea. Patient notes history IBS-D, has chronic diarrhea associated with this. Last night around midnight she developed abdominal pain that was in the right upper quadrant, she notes nausea and vomiting that started this morning. She notes the pain radiates to her right flank slightly. She denies any change in urination. She denies any blood in her stool, she denies any blood in her vomit. She notes some chills and sweats since arriving to the ER. She denies any known fever. She denies chest pain or shortness of breath. She notes a history of hypothyroidism, eosinophilic esophagitis, SVT and she notes her heart rate runs tachycardic at baseline. She has abdominal surgical history of tubal ligation. She follows with Dr. Yolande Salazar, for gastroenterology. HPI was provided by the patient. REVIEW OF SYSTEMS     Review of Systems   Constitutional:  Positive for chills and diaphoresis. Negative for activity change, fatigue and fever. HENT:  Negative for congestion, rhinorrhea and sore throat. Respiratory:  Negative for cough, chest tightness and shortness of breath. Cardiovascular:  Negative for chest pain. Gastrointestinal:  Positive for abdominal pain, diarrhea, nausea and vomiting. Negative for abdominal distention, blood in stool and constipation. Genitourinary:  Positive for flank pain. Negative for decreased urine volume, difficulty urinating, dysuria, frequency and hematuria. Musculoskeletal:  Negative for arthralgias and back pain. Skin:  Negative for color change and rash. Allergic/Immunologic: Negative for immunocompromised state. Neurological:  Negative for dizziness, weakness, light-headedness and numbness. Hematological:  Does not bruise/bleed easily. Psychiatric/Behavioral:  Negative for agitation and confusion. PAST MEDICAL HISTORY     Past Medical History:   Diagnosis Date    Arthritis     Hypothyroid     IBS (irritable bowel syndrome)        SURGICALHISTORY      has a past surgical history that includes Tubal ligation; Endometrial ablation; pr biopsy of breast, needle core (Right, 2000); and US Breast Fine Needle Aspiration (Left, [id number]). CURRENT MEDICATIONS       Current Discharge Medication List        CONTINUE these medications which have NOT CHANGED    Details   metoprolol succinate (TOPROL XL) 50 MG extended release tablet TAKE 1 TABLET BY MOUTH ONCE DAILY  Qty: 90 tablet, Refills: 3      pantoprazole (PROTONIX) 40 MG tablet Take 20 mg by mouth daily      ALPRAZolam (XANAX) 0.5 MG tablet Take 0.5 mg by mouth 2 times daily as needed for Anxiety. buPROPion (WELLBUTRIN SR) 150 MG extended release tablet Take 150 mg by mouth 2 times daily      levothyroxine (SYNTHROID) 50 MCG tablet Take 50 mcg by mouth Daily      colestipol (COLESTID) 1 G tablet Take 2 g by mouth daily       vitamin E 400 UNIT capsule Take 400 Units by mouth daily      !! NONFORMULARY multivit      Calcium Carbonate (CALCIUM 600 PO) Take by mouth      !! NONFORMULARY Vit d 5000 units      MAGNESIUM GLYCINATE PLUS PO Take 100 mg by mouth      traZODone (DESYREL) 50 MG tablet Take 25 mg by mouth nightly      aspirin 81 MG tablet Take 81 mg by mouth daily. montelukast (SINGULAIR) 10 MG tablet Take 10 mg by mouth nightly. cetirizine (ZYRTEC) 10 MG tablet Take 10 mg by mouth nightly. Clidinium-Chlordiazepoxide (LIBRAX PO) Take 1 capsule by mouth 4 times daily as needed. azelastine (ASTELIN) 137 MCG/SPRAY nasal spray 2 sprays by Nasal route nightly. Use in each nostril as directed       !! - Potential duplicate medications found. Please discuss with provider. ALLERGIES     is allergic to cleocin [clindamycin]. FAMILY HISTORY     She indicated that her mother is alive. She indicated that her father is alive. family history includes Heart Disease in her mother; High Cholesterol in her father and mother; Stroke in her father. SOCIAL HISTORY       Social History     Socioeconomic History    Marital status: Single     Spouse name: Not on file    Number of children: Not on file    Years of education: Not on file    Highest education level: Not on file   Occupational History    Not on file   Tobacco Use    Smoking status: Never    Smokeless tobacco: Never   Vaping Use    Vaping Use: Never used   Substance and Sexual Activity    Alcohol use: Yes    Drug use: No    Sexual activity: Not on file   Other Topics Concern    Not on file   Social History Narrative    Not on file     Social Determinants of Health     Financial Resource Strain: Not on file   Food Insecurity: Not on file   Transportation Needs: Not on file   Physical Activity: Not on file   Stress: Not on file   Social Connections: Not on file   Intimate Partner Violence: Not on file   Housing Stability: Not on file       PHYSICAL EXAM     INITIAL VITALS:  height is 5' 3\" (1.6 m) and weight is 140 lb (63.5 kg). Her oral temperature is 97.2 °F (36.2 °C). Her blood pressure is 116/54 (abnormal) and her pulse is 78. Her respiration is 18 and oxygen saturation is 100%. Physical Exam  Vitals and nursing note reviewed. Constitutional:       General: She is not in acute distress. Appearance: Normal appearance. She is well-developed and normal weight. She is ill-appearing. She is not toxic-appearing or diaphoretic. HENT:      Head: Normocephalic. Mouth/Throat:      Mouth: Mucous membranes are moist.      Pharynx: Uvula midline. Eyes:      Conjunctiva/sclera: Conjunctivae normal.   Cardiovascular:      Rate and Rhythm: Normal rate and regular rhythm.       Heart sounds: Normal heart sounds, S1 normal and S2 normal.   Pulmonary:      Effort: Pulmonary effort is normal. No respiratory distress. Breath sounds: Normal breath sounds. Chest:      Chest wall: No tenderness. Abdominal:      General: Bowel sounds are normal. There is no distension. Palpations: Abdomen is soft. There is no hepatomegaly or mass. Tenderness: There is abdominal tenderness in the right upper quadrant and epigastric area. There is no right CVA tenderness, left CVA tenderness, guarding or rebound. Negative signs include Noguera's sign, Rovsing's sign, McBurney's sign, psoas sign and obturator sign. Hernia: No hernia is present. Musculoskeletal:         General: Normal range of motion. Cervical back: Normal range of motion and neck supple. Lymphadenopathy:      Cervical: No cervical adenopathy. Skin:     General: Skin is warm and dry. Capillary Refill: Capillary refill takes less than 2 seconds. Neurological:      General: No focal deficit present. Mental Status: She is alert and oriented to person, place, and time. Psychiatric:         Mood and Affect: Mood normal.         Speech: Speech normal.         Behavior: Behavior normal.         Thought Content: Thought content normal.       DIFFERENTIAL DIAGNOSIS:   Status, cholelithiasis, nephrolithiasis, duodenitis, colitis, low suspicion of ACS  DIAGNOSTIC RESULTS     EKG: All EKG's are interpreted by the Emergency Department Physician who eithersigns or Co-signs this chart in the absence of a cardiologist.    EKG read and interpreted by myself with comparison to September 23, 2008 gives impression of normal sinus rhythm with heart rate of 82; interval 156; QRS 82;QTc 441; axis P-69, R-70, T-48.      RADIOLOGY: non-plainfilm images(s) such as CT, Ultrasound and MRI are read by the radiologist.  Plain radiographic images are visualized and preliminarily interpreted by the emergency physician unless otherwise stated below.  IR ANGIOGRAM SUPERIOR MESENTERIC   Final Result   1 . Abdominal aorta and gastroduodenal artery unremarkable. .    2. Status post successful pseudoaneurysm embolization emanating from a proximal third order branch of the SMA. 3. Incidentally, a small amount of nonocclusive thrombus developed and a few SMA branches during this procedure. These are expected to resolve spontaneously. These findings were communicated to the hospitalist in charge of this patient, prior to the    patient leaving the IR suite. .            **This report has been created using voice recognition software. It may contain minor errors which are inherent in voice recognition technology. **                        Final report electronically signed by Dr. Deb Diane on 12/21/2022 4:35 PM      CT ABDOMEN PELVIS W IV CONTRAST Additional Contrast? None   Final Result       1. 11.4 x 9.6 cm area of abnormal density in the midabdomen suspicious for active bleeding possibly arising from the gastroduodenal artery. Emergent arteriogram and possible embolization may be indicated. 2. Mild diffuse fatty replacement liver. 3. Punctate calcification in the spleen. 4. 4.9 cm left renal cyst.   5. Otherwise negative CT scan of the abdomen and pelvis. .               **This report has been created using voice recognition software. It may contain minor errors which are inherent in voice recognition technology. **      Final report electronically signed by DR Ryan Kohler on 12/21/2022 11:26 AM            LABS:   Labs Reviewed   CBC WITH AUTO DIFFERENTIAL - Abnormal; Notable for the following components:       Result Value    WBC 12.1 (*)     Segs Absolute 9.2 (*)     All other components within normal limits   BASIC METABOLIC PANEL - Abnormal; Notable for the following components:    Sodium 134 (*)     Glucose 160 (*)     All other components within normal limits   LACTIC ACID - Abnormal; Notable for the following components:    Lactic Acid 2.2 (*) All other components within normal limits   OSMOLALITY - Abnormal; Notable for the following components:    Osmolality Calc 271.8 (*)     All other components within normal limits   HEMOGLOBIN AND HEMATOCRIT - Abnormal; Notable for the following components:    Hemoglobin 10.9 (*)     Hematocrit 32.1 (*)     All other components within normal limits   HEPATIC FUNCTION PANEL   LIPASE   TROPONIN   ANION GAP   GLOMERULAR FILTRATION RATE, ESTIMATED   LACTIC ACID   URINALYSIS WITH MICROSCOPIC   LACTIC ACID   HEMOGLOBIN AND HEMATOCRIT   HEMOGLOBIN AND HEMATOCRIT   HEMOGLOBIN AND HEMATOCRIT   PROTIME-INR   APTT   IRON AND TIBC   COMPREHENSIVE METABOLIC PANEL W/ REFLEX TO MG FOR LOW K   CBC WITH AUTO DIFFERENTIAL       EMERGENCY DEPARTMENT COURSE:   Vitals:    Vitals:    12/21/22 1545 12/21/22 1600 12/21/22 1615 12/21/22 1630   BP: 113/62 (!) 119/56 (!) 118/56 (!) 116/54   Pulse: 78 80 75 78   Resp: 18 18 18 18   Temp:       TempSrc:       SpO2: 100% 100% 100% 100%   Weight:       Height:             MDM    Patient was seen and evaluated in the emergency department, patient appeared to be in no acute distress, vital signs were reviewed, tachycardia and mild hypertension noted. Physical exam completed, right upper quadrant tenderness noted, negative Noguera sign, no CVA tenderness, no right lower quadrant tenderness. Labs and imaging were ordered, lab work is reassuring, CT scan reveals 11.4 x 9.6 cm area of abnormal density in the mid abdomen suspicious for acute bleeding possibly arising from the gastroduodenal artery. I discussed this case with the on-call general surgeon Dr. Yehuda Baron, he suggests IR consultation for embolization. I discussed the case with Dr. Page who will take the patient to interventional radiology to perform embolization. I discussed this with the patient, she is in agreement with this plan.   While here in the ER the patient's blood pressure did drop slightly, she was given a 500 mL bolus which seemed to help the blood pressure before the patient went to IR.   Medications   aspirin EC tablet 81 mg ( Oral Automatically Held 12/24/22 0900)   buPROPion [facility] SR) extended release tablet 150 mg (has no administration in time range)   cetirizine (ZYRTEC) tablet 10 mg (has no administration in time range)   levothyroxine (SYNTHROID) tablet 50 mcg (has no administration in time range)   metoprolol succinate (TOPROL XL) extended release tablet 50 mg ( Oral Automatically Held 12/24/22 0900)   montelukast (SINGULAIR) tablet 10 mg (has no administration in time range)   sodium chloride flush 0.9 % injection 10 mL (has no administration in time range)   sodium chloride flush 0.9 % injection 10 mL (has no administration in time range)   0.9 % sodium chloride infusion (has no administration in time range)   polyethylene glycol (GLYCOLAX) packet 17 g (has no administration in time range)   acetaminophen (TYLENOL) tablet 650 mg (has no administration in time range)     Or   acetaminophen (TYLENOL) suppository 650 mg (has no administration in time range)   ondansetron (ZOFRAN-ODT) disintegrating tablet 4 mg (has no administration in time range)     Or   ondansetron (ZOFRAN) injection 4 mg (has no administration in time range)   lactated ringers infusion ( IntraVENous New Bag 12/21/22 1713)   potassium chloride (KLOR-CON M) extended release tablet 40 mEq (has no administration in time range)     Or   potassium bicarb-citric acid (EFFER-K) effervescent tablet 40 mEq (has no administration in time range)     Or   potassium chloride 10 mEq/100 mL IVPB (Peripheral Line) (has no administration in time range)   magnesium sulfate 2000 mg in 50 mL IVPB premix (has no administration in time range)   pantoprazole (PROTONIX) 80 mg in sodium chloride 0.9 % 100 mL infusion (8 mg/hr IntraVENous New Bag 12/21/22 2004)   pantoprazole (PROTONIX) 40 mg in sodium chloride (PF) 0.9 % 10 mL injection (has no administration in time range) hydrALAZINE (APRESOLINE) injection 10 mg (has no administration in time range)   HYDROcodone-acetaminophen (NORCO) 7.5-325 MG per tablet 1 tablet (has no administration in time range)   0.9 % sodium chloride bolus (0 mLs IntraVENous Stopped 12/21/22 1204)   ondansetron (ZOFRAN) injection 4 mg (4 mg IntraVENous Given 12/21/22 0957)   ketorolac (TORADOL) injection 30 mg (30 mg IntraVENous Given 12/21/22 0957)   iopamidol (ISOVUE-370) 76 % injection 80 mL (80 mLs IntraVENous Given 12/21/22 1059)   prochlorperazine (COMPAZINE) injection 10 mg (10 mg IntraVENous Given 12/21/22 1122)   pantoprazole (PROTONIX) 80 mg in sodium chloride 0.9 % 50 mL bolus (80 mg IntraVENous New Bag 12/21/22 1718)   fentaNYL (SUBLIMAZE) injection (50 mcg IntraVENous Given 12/21/22 1312)   midazolam (VERSED) injection (1 mg IntraVENous Given 12/21/22 1312)   iopamidol (ISOVUE-300) 61 % injection (215 mLs Other Given 12/21/22 1512)       Patient was seenindependently by myself. The patient's final impression and disposition and plan was determined by myself. CRITICAL CARE:   None      CONSULTS:  Interventional radiology  General surgery  Hospitalist    PROCEDURES:  None    FINAL IMPRESSION     1. Intra abdominal hemorrhage          DISPOSITION/PLAN   Patient admitted    PATIENT REFERREDTO:  No follow-up provider specified. DISCHARGE MEDICATIONS:  Current Discharge Medication List          (Please note that portions of this note were completed with a voice recognition program.  Efforts were made to edit the dictations but occasionally words are mis-transcribed.)    Provider:  I personally performed the services described in the documentation,reviewed and edited the documentation which was dictated to the scribe in my presence, and it accurately records my words and actions.     Ronan Virgen CNP 12/21/22 8:15 PM    SUSIE Callahan - KIMBERLYN         [employer], SUSIE - CNP  12/21/22 2015

## 2022-12-21 NOTE — H&P
Formulation and discussion of sedation / procedure plans, risks, benefits, side effects and alternatives with patient and/or responsible adult completed. History and Physical reviewed and unchanged.     Electronically signed by Ignacio Cuellar MD on 12/21/22 at 1:09 PM EST

## 2022-12-21 NOTE — H&P
[facility]  Sedation/Analgesia History & Physical    Pt Name: Luciano Hurst  MRN: [id number]  YOB: 1960  Provider Performing Procedure: Mian Ngo MD, MD  Primary Care Physician: Hiren Le MD    Formulation and discussion of sedation / procedure plans, risks, benefits, side effects and alternatives with patient and/or responsible adult completed. PRE-PROCEDURE   DNR-CCA/DNR-CC []Yes [x]No  Brief History/Pre-Procedure Diagnosis: GI bleed          MEDICAL HISTORY  []CAD/Valve  []Liver Disease  []Lung Disease []Diabetes  []Hypertension []Renal Disease  []Additional information:       has a past medical history of Arthritis, Hypothyroid, and IBS (irritable bowel syndrome). SURGICAL HISTORY   has a past surgical history that includes Tubal ligation; Endometrial ablation; pr biopsy of breast, needle core (Right, 2000); and US Breast Fine Needle Aspiration (Left, [id number]).   Additional information:       ALLERGIES   Allergies as of 12/21/2022 - Fully Reviewed 12/21/2022   Allergen Reaction Noted    Cleocin [clindamycin] Diarrhea 04/07/2014     Additional information:       MEDICATIONS   Coumadin Use Last 5 Days [x]No []Yes  Antiplatelet drug therapy use last 5 days  [x]No []Yes  Other anticoagulant use last 5 days  [x]No []Yes    Current Facility-Administered Medications:     pantoprazole (PROTONIX) 80 mg in sodium chloride 0.9 % 50 mL bolus, 80 mg, IntraVENous, Once, Neema Lin PA-C    pantoprazole (PROTONIX) 80 mg in sodium chloride 0.9 % 100 mL infusion, 8 mg/hr, IntraVENous, Continuous, Neema Lin PA-C    [START ON 12/24/2022] pantoprazole (PROTONIX) 40 mg in sodium chloride (PF) 0.9 % 10 mL injection, 40 mg, IntraVENous, Daily, Neema Lin PA-C    octreotide (SANDOSTATIN) injection 100 mcg, 100 mcg, SubCUTAneous, Once, NANO Wiseman    octreotide (SANDOSTATIN) 500 mcg in sodium chloride 0.9 % 100 mL infusion, 25 mcg/hr, IntraVENous, Continuous, Westley Huitron PA-C    lactated ringers infusion, , IntraVENous, Continuous, Neema Lin PA-C    Current Outpatient Medications:     metoprolol succinate (TOPROL XL) 50 MG extended release tablet, TAKE 1 TABLET BY MOUTH ONCE DAILY, Disp: 90 tablet, Rfl: 3    pantoprazole (PROTONIX) 40 MG tablet, Take 20 mg by mouth daily, Disp: , Rfl:     ALPRAZolam (XANAX) 0.5 MG tablet, Take 0.5 mg by mouth 2 times daily as needed for Anxiety. , Disp: , Rfl:     buPROPion (WELLBUTRIN SR) 150 MG extended release tablet, Take 150 mg by mouth 2 times daily, Disp: , Rfl:     levothyroxine (SYNTHROID) 50 MCG tablet, Take 50 mcg by mouth Daily, Disp: , Rfl:     colestipol (COLESTID) 1 G tablet, Take 2 g by mouth daily , Disp: , Rfl:     vitamin E 400 UNIT capsule, Take 400 Units by mouth daily, Disp: , Rfl:     NONFORMULARY, multivit, Disp: , Rfl:     Calcium Carbonate (CALCIUM 600 PO), Take by mouth, Disp: , Rfl:     NONFORMULARY, Vit d 5000 units, Disp: , Rfl:     MAGNESIUM GLYCINATE PLUS PO, Take 100 mg by mouth, Disp: , Rfl:     traZODone (DESYREL) 50 MG tablet, Take 25 mg by mouth nightly, Disp: , Rfl:     aspirin 81 MG tablet, Take 81 mg by mouth daily. , Disp: , Rfl:     montelukast (SINGULAIR) 10 MG tablet, Take 10 mg by mouth nightly., Disp: , Rfl:     cetirizine (ZYRTEC) 10 MG tablet, Take 10 mg by mouth nightly., Disp: , Rfl:     Clidinium-Chlordiazepoxide (LIBRAX PO), Take 1 capsule by mouth 4 times daily as needed. , Disp: , Rfl:     azelastine (ASTELIN) 137 MCG/SPRAY nasal spray, 2 sprays by Nasal route nightly. Use in each nostril as directed, Disp: , Rfl:   Prior to Admission medications    Medication Sig Start Date End Date Taking?  Authorizing Provider   metoprolol succinate (TOPROL XL) 50 MG extended release tablet TAKE 1 TABLET BY MOUTH ONCE DAILY 11/28/22   Breezy Keith MD   pantoprazole (PROTONIX) 40 MG tablet Take 20 mg by mouth daily 6/12/20   Historical Provider, MD Katie Holly) 0.5 MG tablet Take 0.5 mg by mouth 2 times daily as needed for Anxiety. Historical Provider, MD   buPROPion (WELLBUTRIN SR) 150 MG extended release tablet Take 150 mg by mouth 2 times daily    Historical Provider, MD   levothyroxine (SYNTHROID) 50 MCG tablet Take 50 mcg by mouth Daily    Historical Provider, MD   colestipol (COLESTID) 1 G tablet Take 2 g by mouth daily     Historical Provider, MD   vitamin E 400 UNIT capsule Take 400 Units by mouth daily    Historical Provider, MD   NONFORMULARY multivit    Historical Provider, MD   Calcium Carbonate (CALCIUM 600 PO) Take by mouth    Historical Provider, MD   NONFORMULARY Vit d 5000 units    Historical Provider, MD   MAGNESIUM GLYCINATE PLUS PO Take 100 mg by mouth    Historical Provider, MD   traZODone (DESYREL) 50 MG tablet Take 25 mg by mouth nightly    Historical Provider, MD   aspirin 81 MG tablet Take 81 mg by mouth daily. Historical Provider, MD   montelukast (SINGULAIR) 10 MG tablet Take 10 mg by mouth nightly. Historical Provider, MD   cetirizine (ZYRTEC) 10 MG tablet Take 10 mg by mouth nightly. Historical Provider, MD   Clidinium-Chlordiazepoxide (LIBRAX PO) Take 1 capsule by mouth 4 times daily as needed. Historical Provider, MD   azelastine (ASTELIN) 137 MCG/SPRAY nasal spray 2 sprays by Nasal route nightly.  Use in each nostril as directed    Historical Provider, MD     Additional information:       VITAL SIGNS   Vitals:    12/21/22 1304   BP: (!) 110/48   Pulse: 84   Resp: (!) 7   Temp:    SpO2: 99%       PHYSICAL:   Heart:  [x]Regular rate and rhythm  []Other:    Lungs:  [x]Clear    []Other:    Abdomen: [x]Soft    []Other:    Mental Status: [x]Alert & Oriented  []Other:      PLANNED PROCEDURE   []Biospy [x]Arteriogram              []Drainage   []Mediport Insertion  []Fistulogram []IV access       []Vertebroplasty / Augmentation  []IVC filter []Dialysis catheter []Biliary drainage  []Other: []CAPD Catheter []Nephrostomy Tube / Stent  SEDATION  Planned agent: [x]Midazolam []Meperidine [x]Sublimaze []Dilaudid []Morphine     []Diazepam  []Other:     ASA Classification:  []1 [x]2 []3 []4 []5  Class 1: A normal healthy patient  Class 2: Pt with mild to moderate systemic disease  Class 3: Severe systemic disease or disturbance  Class 4: Severe systemic disorders that are already life threatening. Class 5: Moribund pt with little chances of survival, for more than 24 hours. Mallampati I Airway Classification:   []1 [x]2 []3 []4    [x]Pre-procedure diagnostic studies complete and results available. Comment:    [x]Previous sedation/anesthesia experiences assessed. Comment:    [x]The patient is an appropriate candidate to undergo the planned procedure sedation and anesthesia. (Refer to nursing sedation/analgesia documentation record)  [x]Formulation and discussion of sedation/procedure plan, risks, and expectations with patient and/or responsible adult completed. [x]Patient examined immediately prior to the procedure.  (Refer to nursing sedation/analgesia documentation record)    Ann Tabor MD, MD  Electronically signed 12/21/2022 at 1:09 PM

## 2022-12-21 NOTE — Clinical Note
Discharge Plan[de-identified] Other/[facility])   Telemetry/Cardiac Monitoring Required?: Yes

## 2022-12-21 NOTE — PROGRESS NOTES
Admission questions completed with patient. Educated on virtual nursing role/cares provided. Patient agreed to virtual safety rounds. Call don't fall policy reviewed. Medications reviewed and completed. Patient would like to participate in Meds to [facility] if insurance authorized. Denies further needs/questions at this time. Bedside Nurse notified.

## 2022-12-21 NOTE — OP NOTE
Department of Radiology  Post Procedure Progress Note      Pre-Procedure Diagnosis:  GI  bleed    Procedure Performed:  angiography with embolization of bleeding branch of SMA    Anesthesia: local / versed and fentanyl    Findings: successful    Immediate Complications:  None    Estimated Blood Loss: minimal    SEE DICTATED PROCEDURE NOTE FOR COMPLETE DETAILS.     Farida Jarquin MD   12/21/2022 3:11 PM

## 2022-12-21 NOTE — H&P
Hospitalist - History & Physical      Patient: Ernst Lockwood    Unit/Bed:10/010A  YOB: 1960  MRN: [id number]   Acct: [de-identified]   PCP: Ozzy Terrazas MD    Date of Service: Pt seen/examined on 12/21/22  and Admitted to Inpatient with expected LOS greater than two midnights due to medical therapy. Chief Complaint:  Abdominal pain    Assessment and Plan:  Suspected GI bleed with hematoma, secondary to duodenal ulcer:    Pt admitted with RUQ pain- pt is afebrile, Initially hemodynamically stable, however developed hypotension. No hematemesis, melena, hematochezia. Labs show Hgb 12.8, 37.2. LA 2.2. CT abdomen pelvis shows 11.4 x 9.6 cm area of abnormal density in the midabdomen suspicious for active bleeding possibly arising from the gastroduodenal artery. ED provider consulted Dr. Aleksandar Hernandez with General surgery, who recommended IR consultation. IR consulted for emergent embolization- Per ED provider, spoke with Dr. Welton Caller. Given Toradol, antiemetic in ED, 1L IVF bolus. 500cc NS bolus ordered with maintenance IVF. Start Protonix bolus and gtt, Octreotide bolus with infusion. Stat H&H recheck. Hypotension:    SBP noted to be low 80s, high 70s while in the room. Pt asymptomatic. Suspect secondary to #1. 500cc bolus ordered with subsequent improvement of BP. Maintenance IVF ordered. Continue to monitor closely. Dr. Welton Caller with IR updated. Hyperglycemia:    Glucose 160 today. No history of DM. Suspect reactive. Continue to monitor with daily BMP. Hx of irritable bowel syndrome:    Noted per chart review. Continue home meds when able. Hypothyroidism:    Continue synthroid when able. 6. Code status:     Discussed code status with patient- Pt affirms Full code at this time. History Of Present Illness:    Duana Habermann is a 66 y/o female with a PMHx of IBS, Hypothyroidism who presents to [facility] ED today for abdominal pain.  Pt states she had some pain in the right side of her stomach this past Thursday and Friday then resolved, however the pain recurred yesterday and has been persistent, therefore she came in to be evaluated today. Pt reports the pain is constant, but worse when she eats. Pain is improved with with lying on her right site and rest. Pt reports associated chills, nausea, dry heaves, and 2 episodes of vomiting while in the ED. She denies fever, hematemesis, hematochezia, melena. 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 Past Medical History:        Diagnosis Date    Arthritis     Hypothyroid     IBS (irritable bowel syndrome)        Past Surgical History:        Procedure Laterality Date    ENDOMETRIAL ABLATION      ME BIOPSY OF BREAST, NEEDLE CORE Right 2000    done at [place] [address] Left [id number]    done at [address] [place] [address] Medications:   No current facility-administered medications on file prior to encounter. Current Outpatient Medications on File Prior to Encounter   Medication Sig Dispense Refill    metoprolol succinate (TOPROL XL) 50 MG extended release tablet TAKE 1 TABLET BY MOUTH ONCE DAILY 90 tablet 3    pantoprazole (PROTONIX) 40 MG tablet Take 20 mg by mouth daily      ALPRAZolam (XANAX) 0.5 MG tablet Take 0.5 mg by mouth 2 times daily as needed for Anxiety. buPROPion (WELLBUTRIN SR) 150 MG extended release tablet Take 150 mg by mouth 2 times daily      levothyroxine (SYNTHROID) 50 MCG tablet Take 50 mcg by mouth Daily      colestipol (COLESTID) 1 G tablet Take 2 g by mouth daily       vitamin E 400 UNIT capsule Take 400 Units by mouth daily      NONFORMULARY multivit      Calcium Carbonate (CALCIUM 600 PO) Take by mouth      NONFORMULARY Vit d 5000 units      MAGNESIUM GLYCINATE PLUS PO Take 100 mg by mouth      traZODone (DESYREL) 50 MG tablet Take 25 mg by mouth nightly      aspirin 81 MG tablet Take 81 mg by mouth daily. montelukast (SINGULAIR) 10 MG tablet Take 10 mg by mouth nightly. cetirizine (ZYRTEC) 10 MG tablet Take 10 mg by mouth nightly. Clidinium-Chlordiazepoxide (LIBRAX PO) Take 1 capsule by mouth 4 times daily as needed. azelastine (ASTELIN) 137 MCG/SPRAY nasal spray 2 sprays by Nasal route nightly. Use in each nostril as directed         Allergies:    Cleocin [clindamycin]    Social History:    reports that she has never smoked. She has never used smokeless tobacco. She reports current alcohol use. She reports that she does not use drugs. Family History:       Problem Relation Age of Onset    High Cholesterol Mother     Heart Disease Mother     High Cholesterol Father     Stroke Father        Diet:  No diet orders on file    Review of systems:     Review of Systems   Constitutional:  Positive for activity change, appetite change and chills. Negative for fatigue and fever. HENT:  Negative for congestion, rhinorrhea and sore throat. Respiratory:  Negative for chest tightness and shortness of breath. Cardiovascular:  Negative for chest pain and leg swelling. Gastrointestinal:  Positive for abdominal pain, nausea and vomiting. Negative for abdominal distention, blood in stool, constipation and diarrhea. Genitourinary:  Negative for frequency and urgency. Musculoskeletal:  Negative for arthralgias and gait problem. Skin:  Negative for color change. Neurological:  Positive for weakness and light-headedness (one occurance today). Negative for dizziness. Psychiatric/Behavioral:  The patient is not nervous/anxious. PHYSICAL EXAM:  BP (!) 135/56   Pulse 82   Temp 97.8 °F (36.6 °C)   Resp 16   Ht 5' 3\" (1.6 m)   Wt 140 lb (63.5 kg)   SpO2 94%   BMI 24.80 kg/m²   General appearance: No apparent distress. Appears stated age and cooperative. Skin: Skin color, texture, turgor normal.  No rashes or lesions. HEENT: Normal cephalic, atraumatic without obvious deformity. Pupils equal, round, and reactive to light. Extra-ocular muscles intact. Conjunctivae/corneas clear. Neck: Trachea midline. Supple, with full range of motion. No jugular venous distention. Cardiovascular: Regular rate and rhythm with normal S1/S2. No murmurs, rubs or gallops. Respiratory:  Normal respiratory effort. Clear to auscultation, bilaterally without rales, wheezes, or rhonchi. Abdomen: Guarding to the left lower quadrant, right upper and right lower quadrant. Otherwise Soft, non-distended. No peritoneal signs at this time. Normal bowel sounds. Musculoskeletal:  No weakness or instability noted. No edema, erythema, or gross deformity noted. Vascular:  Pulses +2 palpable, equal bilaterally. Neurologic:  Neurovascularly intact without any focal sensory/motor deficits. Cranial nerves: II-XII grossly intact. Psychiatric: Alert and oriented, thought content appropriate, normal insight      Labs:   Recent Labs     12/21/22  0945   WBC 12.1*   HGB 12.8   HCT 37.2        Recent Labs     12/21/22  0945   *   K 4.0   CL 99   CO2 24   BUN 13   CREATININE 0.8   CALCIUM 9.6     Recent Labs     12/21/22  0945   AST 18   ALT 17   BILIDIR <0.2   BILITOT 0.4   ALKPHOS 106     No results for input(s): INR in the last 72 hours. No results for input(s): [place] in the last 72 hours. Urinalysis:    No results found for: [place], BACTERIA, RBCUA, BLOODU, [address], [address]    Radiology:   CT ABDOMEN PELVIS W IV CONTRAST Additional Contrast? None   Final Result       1. 11.4 x 9.6 cm area of abnormal density in the midabdomen suspicious for active bleeding possibly arising from the gastroduodenal artery. Emergent arteriogram and possible embolization may be indicated. 2. Mild diffuse fatty replacement liver. 3. Punctate calcification in the spleen. 4. 4.9 cm left renal cyst.   5. Otherwise negative CT scan of the abdomen and pelvis. .               **This report has been created using voice recognition software.  It may contain minor errors which are inherent in voice recognition technology. **      Final report electronically signed by DR Breana Figueroa on 12/21/2022 11:26 AM      IR ANGIOGRAM SUPERIOR MESENTERIC    (Results Pending)     CT ABDOMEN PELVIS W IV CONTRAST Additional Contrast? None    Result Date: 12/21/2022  PROCEDURE: CT ABDOMEN PELVIS W IV CONTRAST CLINICAL INFORMATION: RUQ pain . COMPARISON: None. TECHNIQUE: Axial 5 mm CT images were obtained through the abdomen and pelvis after the administration of intravenous contrast. Coronal and sagittal reconstructions were obtained. All CT scans at this facility use dose modulation, iterative reconstruction, and/or weight-based dosing when appropriate to reduce radiation dose to as low as reasonably achievable. FINDINGS: The visualized aspects of the lung bases are clear. The base of the heart is within appropriate limits. There is mild diffuse fatty replacement in the liver. . There is punctate calcification in the spleen. The adrenals and pancreas are normal. The gallbladder is normal.    There is no hydronephrosis or stones of either kidney. There is a 4.9 cm left renal cyst.  There is an 11.4 x 9.6 cm area of abnormal density in the midabdomen. This is suspicious for active hemorrhage possibly arising from the gastroduodenal artery. Urgent arteriogram and possible embolization may be indicated. No abnormalities of the small bowel loops are noted. The IVC and aorta are of normal caliber. There is no adenopathy. The urinary bladder is normal. There is no pelvic free fluid. The colon is within normal limits. There is no adenopathy. No suspicious osseous lesions are identified. 1. 11.4 x 9.6 cm area of abnormal density in the midabdomen suspicious for active bleeding possibly arising from the gastroduodenal artery. Emergent arteriogram and possible embolization may be indicated. 2. Mild diffuse fatty replacement liver. 3. Punctate calcification in the spleen.  4. 4.9 cm left renal cyst. 5. Otherwise negative CT scan of the abdomen and pelvis. . **This report has been created using voice recognition software. It may contain minor errors which are inherent in voice recognition technology. ** Final report electronically signed by DR Vincent Zuniga on 12/21/2022 11:26 AM        EKG:  NSR    Electronically signed by Dakota Reilly PA-C on 12/21/2022 at 11:54 AM

## 2022-12-22 LAB
ALBUMIN SERPL-MCNC: 3.5 G/DL (ref 3.5–5.1)
ALP BLD-CCNC: 82 U/L (ref 38–126)
ALT SERPL-CCNC: 13 U/L (ref 11–66)
ANION GAP SERPL CALCULATED.3IONS-SCNC: 10 MEQ/L (ref 8–16)
APTT: 27.9 SECONDS (ref 22–38)
AST SERPL-CCNC: 16 U/L (ref 5–40)
BASOPHILS # BLD: 0.3 %
BASOPHILS ABSOLUTE: 0 THOU/MM3 (ref 0–0.1)
BILIRUB SERPL-MCNC: 0.5 MG/DL (ref 0.3–1.2)
BUN BLDV-MCNC: 8 MG/DL (ref 7–22)
CALCIUM SERPL-MCNC: 8.6 MG/DL (ref 8.5–10.5)
CHLORIDE BLD-SCNC: 106 MEQ/L (ref 98–111)
CO2: 25 MEQ/L (ref 23–33)
CREAT SERPL-MCNC: 0.8 MG/DL (ref 0.4–1.2)
EOSINOPHIL # BLD: 0.4 %
EOSINOPHILS ABSOLUTE: 0 THOU/MM3 (ref 0–0.4)
ERYTHROCYTE [DISTWIDTH] IN BLOOD BY AUTOMATED COUNT: 13 % (ref 11.5–14.5)
ERYTHROCYTE [DISTWIDTH] IN BLOOD BY AUTOMATED COUNT: 41.3 FL (ref 35–45)
GFR SERPL CREATININE-BSD FRML MDRD: > 60 ML/MIN/1.73M2
GLUCOSE BLD-MCNC: 98 MG/DL (ref 70–108)
HCT VFR BLD CALC: 28.8 % (ref 37–47)
HCT VFR BLD CALC: 29.4 % (ref 37–47)
HCT VFR BLD CALC: 29.6 % (ref 37–47)
HEMOGLOBIN: 10 GM/DL (ref 12–16)
HEMOGLOBIN: 10 GM/DL (ref 12–16)
HEMOGLOBIN: 9.9 GM/DL (ref 12–16)
IMMATURE GRANS (ABS): 0.01 THOU/MM3 (ref 0–0.07)
IMMATURE GRANULOCYTES: 0.1 %
INR BLD: 1.02 (ref 0.85–1.13)
IRON: 76 UG/DL (ref 50–170)
LYMPHOCYTES # BLD: 25.4 %
LYMPHOCYTES ABSOLUTE: 1.9 THOU/MM3 (ref 1–4.8)
MCH RBC QN AUTO: 29.4 PG (ref 26–33)
MCHC RBC AUTO-ENTMCNC: 33.8 GM/DL (ref 32.2–35.5)
MCV RBC AUTO: 87.1 FL (ref 81–99)
MONOCYTES # BLD: 9.3 %
MONOCYTES ABSOLUTE: 0.7 THOU/MM3 (ref 0.4–1.3)
NUCLEATED RED BLOOD CELLS: 0 /100 WBC
PLATELET # BLD: 177 THOU/MM3 (ref 130–400)
PMV BLD AUTO: 10.7 FL (ref 9.4–12.4)
POTASSIUM REFLEX MAGNESIUM: 4.2 MEQ/L (ref 3.5–5.2)
RBC # BLD: 3.4 MILL/MM3 (ref 4.2–5.4)
SEG NEUTROPHILS: 64.5 %
SEGMENTED NEUTROPHILS ABSOLUTE COUNT: 4.8 THOU/MM3 (ref 1.8–7.7)
SODIUM BLD-SCNC: 141 MEQ/L (ref 135–145)
TOTAL IRON BINDING CAPACITY: 226 UG/DL (ref 171–450)
TOTAL PROTEIN: 5.2 G/DL (ref 6.1–8)
WBC # BLD: 7.5 THOU/MM3 (ref 4.8–10.8)

## 2022-12-22 PROCEDURE — 85014 HEMATOCRIT: CPT

## 2022-12-22 PROCEDURE — 2060000000 HC ICU INTERMEDIATE R&B

## 2022-12-22 PROCEDURE — 6360000002 HC RX W HCPCS

## 2022-12-22 PROCEDURE — 2580000003 HC RX 258

## 2022-12-22 PROCEDURE — 80053 COMPREHEN METABOLIC PANEL: CPT

## 2022-12-22 PROCEDURE — 36415 COLL VENOUS BLD VENIPUNCTURE: CPT

## 2022-12-22 PROCEDURE — 85025 COMPLETE CBC W/AUTO DIFF WBC: CPT

## 2022-12-22 PROCEDURE — 85610 PROTHROMBIN TIME: CPT

## 2022-12-22 PROCEDURE — 83550 IRON BINDING TEST: CPT

## 2022-12-22 PROCEDURE — 83540 ASSAY OF IRON: CPT

## 2022-12-22 PROCEDURE — 85018 HEMOGLOBIN: CPT

## 2022-12-22 PROCEDURE — C9113 INJ PANTOPRAZOLE SODIUM, VIA: HCPCS

## 2022-12-22 PROCEDURE — 6370000000 HC RX 637 (ALT 250 FOR IP)

## 2022-12-22 PROCEDURE — 85730 THROMBOPLASTIN TIME PARTIAL: CPT

## 2022-12-22 RX ORDER — QUETIAPINE FUMARATE 25 MG/1
25 TABLET, FILM COATED ORAL 2 TIMES DAILY
Status: CANCELLED | OUTPATIENT
Start: 2022-12-22

## 2022-12-22 RX ORDER — MONTELUKAST SODIUM 4 MG/1
2 TABLET, CHEWABLE ORAL
Status: DISCONTINUED | OUTPATIENT
Start: 2022-12-23 | End: 2022-12-24 | Stop reason: HOSPADM

## 2022-12-22 RX ADMIN — BUPROPION HYDROCHLORIDE 150 MG: 150 TABLET, EXTENDED RELEASE ORAL at 20:18

## 2022-12-22 RX ADMIN — SODIUM CHLORIDE 8 MG/HR: 9 INJECTION, SOLUTION INTRAVENOUS at 05:12

## 2022-12-22 RX ADMIN — CETIRIZINE HYDROCHLORIDE 10 MG: 10 TABLET, FILM COATED ORAL at 20:18

## 2022-12-22 RX ADMIN — SODIUM CHLORIDE, POTASSIUM CHLORIDE, SODIUM LACTATE AND CALCIUM CHLORIDE: 600; 310; 30; 20 INJECTION, SOLUTION INTRAVENOUS at 15:32

## 2022-12-22 RX ADMIN — LEVOTHYROXINE SODIUM 50 MCG: 0.05 TABLET ORAL at 05:23

## 2022-12-22 RX ADMIN — BUPROPION HYDROCHLORIDE 150 MG: 150 TABLET, EXTENDED RELEASE ORAL at 08:21

## 2022-12-22 RX ADMIN — SODIUM CHLORIDE 8 MG/HR: 9 INJECTION, SOLUTION INTRAVENOUS at 15:31

## 2022-12-22 RX ADMIN — MONTELUKAST SODIUM 10 MG: 10 TABLET ORAL at 20:18

## 2022-12-22 NOTE — PROGRESS NOTES
12/22/22 0945   Encounter Summary   Encounter Overview/Reason  Spiritual/Emotional Needs   Service Provided For: Patient   Referral/Consult From: [place]   Last Encounter  12/22/22   Complexity of Encounter Low   Begin Time 0940   End Time  0945   Total Time Calculated 5 min   Encounter    Type Initial Screen/Assessment   Spiritual/Emotional needs   Type Spiritual Support   Assessment/Intervention/Outcome   Assessment Unable to assess   Intervention Prayer (assurance of)/[place];Sustaining Presence/Ministry of presence   During my encounter with the 65 yr old patient, I attempted to visit with the pt on 4K. The patient appears to be resting (not responsive) now and I didn't want to disturb the patient. I or another Karma Mini will attempt to visit the patient or the family at another time. The pt was admitted due to abdominal pain.

## 2022-12-22 NOTE — PLAN OF CARE
Problem: Discharge Planning  Goal: Discharge to home or other facility with appropriate resources  12/22/2022 1111 by Jared Carlson RN  Outcome: Progressing  Flowsheets (Taken 12/22/2022 1111)  Discharge to home or other facility with appropriate resources:   Identify barriers to discharge with patient and caregiver   Identify discharge learning needs (meds, wound care, etc)   Refer to discharge planning if patient needs post-hospital services based on physician order or complex needs related to functional status, cognitive ability or social support system  12/22/2022 1111 by Jared Carlson RN  Outcome: Progressing  Flowsheets (Taken 12/22/2022 1111)  Discharge to home or other facility with appropriate resources:   Identify barriers to discharge with patient and caregiver   Identify discharge learning needs (meds, wound care, etc)   Refer to discharge planning if patient needs post-hospital services based on physician order or complex needs related to functional status, cognitive ability or social support system     Problem: Pain  Goal: Verbalizes/displays adequate comfort level or baseline comfort level  12/22/2022 1111 by Jared Carlson RN  Outcome: Progressing  Flowsheets (Taken 12/22/2022 1111)  Verbalizes/displays adequate comfort level or baseline comfort level:   Encourage patient to monitor pain and request assistance   Administer analgesics based on type and severity of pain and evaluate response  12/22/2022 1111 by Jared Carlson RN  Outcome: Progressing  Flowsheets (Taken 12/22/2022 1111)  Verbalizes/displays adequate comfort level or baseline comfort level:   Encourage patient to monitor pain and request assistance   Administer analgesics based on type and severity of pain and evaluate response     Problem: ABCDS Injury Assessment  Goal: Absence of physical injury  12/22/2022 1111 by Jared Carlson RN  Outcome: Progressing  Flowsheets (Taken 12/22/2022 1111)  Absence of Physical Injury: Implement safety measures based on patient assessment  12/22/2022 1111 by Papito Key RN  Outcome: Progressing  Flowsheets (Taken 12/22/2022 1111)  Absence of Physical Injury: Implement safety measures based on patient assessment     Problem: Safety - Adult  Goal: Free from fall injury  12/22/2022 1111 by Papito Key RN  Outcome: Progressing  Flowsheets (Taken 12/22/2022 1111)  Free From Fall Injury:   Instruct family/caregiver on patient safety   Based on caregiver fall risk screen, instruct family/caregiver to ask for assistance with transferring infant if caregiver noted to have fall risk factors  12/22/2022 1111 by Papito Key RN  Outcome: Progressing  Flowsheets (Taken 12/22/2022 1111)  Free From Fall Injury:   Instruct family/caregiver on patient safety   Based on caregiver fall risk screen, instruct family/caregiver to ask for assistance with transferring infant if caregiver noted to have fall risk factors     Problem: Skin/Tissue Integrity  Goal: Absence of new skin breakdown  Description: 1. Monitor for areas of redness and/or skin breakdown  2. Assess vascular access sites hourly  3. Every 4-6 hours minimum:  Change oxygen saturation probe site  4. Every 4-6 hours:  If on nasal continuous positive airway pressure, respiratory therapy assess nares and determine need for appliance change or resting period. 12/22/2022 1111 by Papito Key RN  Outcome: Progressing  Note: No new skin breakdown.    12/22/2022 1111 by Papito Key RN  Outcome: Progressing     Problem: Gastrointestinal - Adult  Goal: Minimal or absence of nausea and vomiting  12/22/2022 1111 by Papito Key RN  Outcome: Progressing  Flowsheets (Taken 12/22/2022 1111)  Minimal or absence of nausea and vomiting:   Administer IV fluids as ordered to ensure adequate hydration   Administer ordered antiemetic medications as needed   Advance diet as tolerated, if ordered  12/22/2022 1111 by Papito Key RN  Outcome: Progressing  Flowsheets (Taken 12/22/2022 1111)  Minimal or absence of nausea and vomiting:   Administer IV fluids as ordered to ensure adequate hydration   Administer ordered antiemetic medications as needed   Advance diet as tolerated, if ordered  Goal: Maintains or returns to baseline bowel function  12/22/2022 1111 by Manuela Glover RN  Outcome: Progressing  Flowsheets (Taken 12/22/2022 1111)  Maintains or returns to baseline bowel function:   Assess bowel function   Encourage oral fluids to ensure adequate hydration   Administer ordered medications as needed  12/22/2022 1111 by Manuela Glover RN  Outcome: Progressing  Flowsheets (Taken 12/22/2022 1111)  Maintains or returns to baseline bowel function:   Assess bowel function   Encourage oral fluids to ensure adequate hydration   Administer ordered medications as needed  Goal: Maintains adequate nutritional intake  12/22/2022 1111 by Manuela Glover RN  Outcome: Progressing  Flowsheets (Taken 12/22/2022 1111)  Maintains adequate nutritional intake:   Monitor percentage of each meal consumed   Monitor intake and output, weight and lab values   Identify factors contributing to decreased intake, treat as appropriate   Assist with meals as needed  12/22/2022 1111 by Manuela Glover RN  Outcome: Progressing  Flowsheets (Taken 12/22/2022 1111)  Maintains adequate nutritional intake:   Monitor percentage of each meal consumed   Monitor intake and output, weight and lab values   Identify factors contributing to decreased intake, treat as appropriate   Assist with meals as needed     Care plan reviewed with patient. Patient verbalize understanding of the plan of care and contribute to goal setting.

## 2022-12-22 NOTE — PROGRESS NOTES
Hospitalist Progress Note    Patient:  Francisca Irvin      Unit/Bed:4K-02/002-A    YOB: 1960    MRN: [id number]       Acct: [de-identified]     PCP: Swetha Pa MD    Date of Admission: 12/21/2022    Active Hospital Problems    Diagnosis Date Noted    Abdominal pain [R10.9] 12/21/2022     Priority: Medium     Assessment/Plan:    # Duodenal Ulcer Complicated by Bleeding SMA s/p Successful Angiography with Embolization. CT abdomen pelvis shows 11.4 x 9.6 cm area of abnormal density in the midabdomen suspicious for active bleeding possibly arising from the gastroduodenal artery. Embolization done by IR on 12/21/22. Cont Protonix drip x 72 hrs. Cont to trend H&H q 8 hrs. Transfuse if Hb < 7. Avoid all blood thinners. # Acute Blood Loss Anemia 2/2 Above. Hb baseline 12, dropped to 10. Cont to trend H&H q 8 hrs. Transfuse if Hb < 7. Avoid all blood thinners. # Hypotension secondary to acute blood loss. SBP noted to be low 80s initially, improved with NS bolus. Currently BP improving. Cont to trend daily. # Hx of GERD. Cont Protonix on discharge, will need GI follow-up for outpatient EGD. # Hx of irritable bowel syndrome. Noted per chart review. Continue home meds when able. Chief Complaint: abdominal pain     Hospital Course: Luis Fernando Peres is a 64 y/o female with a PMHx of IBS, Hypothyroidism who presents to [address] [place] [address] ED today for abdominal pain. Pt states she had some pain in the right side of her stomach this past Thursday and Friday then resolved, however the pain recurred yesterday and has been persistent, therefore she came in to be evaluated today. Pt reports the pain is constant, but worse when she eats. Pain is improved with with lying on her right site and rest. Pt reports associated chills, nausea, dry heaves, and 2 episodes of vomiting while in the ED. She denies fever, hematemesis, hematochezia, melena. 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      Subjective: no acute events overnight. Sitting up in chair, reports feeling much better. Continues to have mild abdominal discomfort. No nausea or vomiting. No fevers or chills. No chest pain or sob. Medications:  Reviewed    Infusion Medications    sodium chloride      lactated ringers 75 mL/hr at 12/21/22 1713    pantoprazole 8 mg/hr (12/22/22 0512)     Scheduled Medications    [Held by provider] aspirin  81 mg Oral Daily    buPROPion  150 mg Oral BID    cetirizine  10 mg Oral Nightly    levothyroxine  50 mcg Oral Daily    [Held by provider] metoprolol succinate  50 mg Oral Daily    montelukast  10 mg Oral Nightly    sodium chloride flush  10 mL IntraVENous 2 times per day     PRN Meds: sodium chloride flush, sodium chloride, polyethylene glycol, acetaminophen **OR** acetaminophen, ondansetron **OR** ondansetron, potassium chloride **OR** potassium alternative oral replacement **OR** potassium chloride, magnesium sulfate, hydrALAZINE, HYDROcodone-acetaminophen      Intake/Output Summary (Last 24 hours) at 12/22/2022 1322  Last data filed at 12/22/2022 0915  Gross per 24 hour   Intake 830 ml   Output 700 ml   Net 130 ml       Diet:  ADULT DIET; Clear Liquid    Exam:  /60   Pulse 85   Temp 97.8 °F (36.6 °C) (Oral)   Resp 18   Ht 5' 3\" (1.6 m)   Wt 140 lb (63.5 kg)   SpO2 99%   BMI 24.80 kg/m²     General appearance: No apparent distress, appears stated age and cooperative. HEENT: Pupils equal, round, and reactive to light. Conjunctivae/corneas clear. Neck: Supple, with full range of motion. No jugular venous distention. Trachea midline. Respiratory:  Normal respiratory effort. Clear to auscultation, bilaterally without Rales/Wheezes/Rhonchi. Cardiovascular: Regular rate and rhythm with normal S1/S2 without murmurs, rubs or gallops. Abdomen: Soft, non-tender, non-distended with normal bowel sounds. Musculoskeletal: passive and active ROM x 4 extremities.   Skin: Skin color, texture, turgor normal.  No rashes or lesions. Neurologic:  Neurovascularly intact without any focal sensory/motor deficits. Cranial nerves: II-XII intact, grossly non-focal.  Psychiatric: Alert and oriented, thought content appropriate, normal insight  Capillary Refill: Brisk,< 3 seconds   Peripheral Pulses: +2 palpable, equal bilaterally       Labs:   Recent Labs     12/22/22 0419   WBC 7.5   HGB 10.0*   HCT 29.6*        Recent Labs     12/22/22 0419      K 4.2      CO2 25   BUN 8   CREATININE 0.8   CALCIUM 8.6     Recent Labs     12/21/22  0945 12/22/22 0419   AST 18 16   ALT 17 13   BILIDIR <0.2  --    BILITOT 0.4 0.5   ALKPHOS 106 82     Recent Labs     12/22/22 0419   INR 1.02     No results for input(s): Douglas Viky in the last 72 hours. Urinalysis:    No results found for: Greg Mccormick, [address] [place] [address], [address] [place] [address] [facility] [address] Gilberto Yang    Radiology: All imaging reviewed     Diet: ADULT DIET;  Clear Liquid      Code Status: Full Code              Electronically signed by Romaine Ramirez MD on 12/22/2022 at 1:22 PM

## 2022-12-22 NOTE — FLOWSHEET NOTE
12/22/22 1059   Safe Environment   Safety Measures Other (comment)  (VN safety round completed)   Call placed , pt responds to audio , permits video . Pt denied any voiced concerns or complaints at this time , call light within reach , no s/s distress noted .  Pt pleasant and interactive with call , pt currently setting up in the chair at the bedside and has no complaints

## 2022-12-23 LAB
ALBUMIN SERPL-MCNC: 3.2 G/DL (ref 3.5–5.1)
ALP BLD-CCNC: 75 U/L (ref 38–126)
ALT SERPL-CCNC: 13 U/L (ref 11–66)
ANION GAP SERPL CALCULATED.3IONS-SCNC: 12 MEQ/L (ref 8–16)
AST SERPL-CCNC: 18 U/L (ref 5–40)
BILIRUB SERPL-MCNC: 0.5 MG/DL (ref 0.3–1.2)
BUN BLDV-MCNC: 5 MG/DL (ref 7–22)
CALCIUM SERPL-MCNC: 8.7 MG/DL (ref 8.5–10.5)
CHLORIDE BLD-SCNC: 108 MEQ/L (ref 98–111)
CO2: 25 MEQ/L (ref 23–33)
CREAT SERPL-MCNC: 0.7 MG/DL (ref 0.4–1.2)
GFR SERPL CREATININE-BSD FRML MDRD: > 60 ML/MIN/1.73M2
GLUCOSE BLD-MCNC: 101 MG/DL (ref 70–108)
HCT VFR BLD CALC: 27.3 % (ref 37–47)
HCT VFR BLD CALC: 30.3 % (ref 37–47)
HEMOGLOBIN: 10.6 GM/DL (ref 12–16)
HEMOGLOBIN: 9.2 GM/DL (ref 12–16)
POTASSIUM REFLEX MAGNESIUM: 3.9 MEQ/L (ref 3.5–5.2)
SODIUM BLD-SCNC: 145 MEQ/L (ref 135–145)
TOTAL PROTEIN: 5.2 G/DL (ref 6.1–8)

## 2022-12-23 PROCEDURE — 2060000000 HC ICU INTERMEDIATE R&B

## 2022-12-23 PROCEDURE — 6360000002 HC RX W HCPCS

## 2022-12-23 PROCEDURE — 80053 COMPREHEN METABOLIC PANEL: CPT

## 2022-12-23 PROCEDURE — C9113 INJ PANTOPRAZOLE SODIUM, VIA: HCPCS

## 2022-12-23 PROCEDURE — 6370000000 HC RX 637 (ALT 250 FOR IP)

## 2022-12-23 PROCEDURE — 85018 HEMOGLOBIN: CPT

## 2022-12-23 PROCEDURE — 2580000003 HC RX 258

## 2022-12-23 PROCEDURE — 85014 HEMATOCRIT: CPT

## 2022-12-23 PROCEDURE — 36415 COLL VENOUS BLD VENIPUNCTURE: CPT

## 2022-12-23 RX ADMIN — BUPROPION HYDROCHLORIDE 150 MG: 150 TABLET, EXTENDED RELEASE ORAL at 09:20

## 2022-12-23 RX ADMIN — SODIUM CHLORIDE 8 MG/HR: 9 INJECTION, SOLUTION INTRAVENOUS at 02:33

## 2022-12-23 RX ADMIN — MONTELUKAST SODIUM 2 G: 4 TABLET, CHEWABLE ORAL at 16:28

## 2022-12-23 RX ADMIN — SODIUM CHLORIDE, POTASSIUM CHLORIDE, SODIUM LACTATE AND CALCIUM CHLORIDE: 600; 310; 30; 20 INJECTION, SOLUTION INTRAVENOUS at 05:27

## 2022-12-23 RX ADMIN — BUPROPION HYDROCHLORIDE 150 MG: 150 TABLET, EXTENDED RELEASE ORAL at 20:20

## 2022-12-23 RX ADMIN — MONTELUKAST SODIUM 10 MG: 10 TABLET ORAL at 20:20

## 2022-12-23 RX ADMIN — SODIUM CHLORIDE 8 MG/HR: 9 INJECTION, SOLUTION INTRAVENOUS at 22:39

## 2022-12-23 RX ADMIN — CETIRIZINE HYDROCHLORIDE 10 MG: 10 TABLET, FILM COATED ORAL at 20:20

## 2022-12-23 RX ADMIN — LEVOTHYROXINE SODIUM 50 MCG: 0.05 TABLET ORAL at 09:20

## 2022-12-23 RX ADMIN — SODIUM CHLORIDE 8 MG/HR: 9 INJECTION, SOLUTION INTRAVENOUS at 12:19

## 2022-12-23 ASSESSMENT — PAIN SCALES - GENERAL
PAINLEVEL_OUTOF10: 0
PAINLEVEL_OUTOF10: 0

## 2022-12-23 NOTE — PLAN OF CARE
Problem: Discharge Planning  Goal: Discharge to home or other facility with appropriate resources  Outcome: Progressing  Flowsheets (Taken 12/22/2022 1111 by Didier Phillips RN)  Discharge to home or other facility with appropriate resources:   Identify barriers to discharge with patient and caregiver   Identify discharge learning needs (meds, wound care, etc)   Refer to discharge planning if patient needs post-hospital services based on physician order or complex needs related to functional status, cognitive ability or social support system     Problem: Pain  Goal: Verbalizes/displays adequate comfort level or baseline comfort level  Outcome: Progressing  Flowsheets (Taken 12/22/2022 1111 by Didier Phillips RN)  Verbalizes/displays adequate comfort level or baseline comfort level:   Encourage patient to monitor pain and request assistance   Administer analgesics based on type and severity of pain and evaluate response     Problem: ABCDS Injury Assessment  Goal: Absence of physical injury  Outcome: Progressing  Flowsheets (Taken 12/22/2022 1111 by Didier Phillips RN)  Absence of Physical Injury: Implement safety measures based on patient assessment     Problem: Safety - Adult  Goal: Free from fall injury  Outcome: Progressing  Flowsheets (Taken 12/22/2022 1111 by Didier Phillips RN)  Free From Fall Injury:   Instruct family/caregiver on patient safety   Based on caregiver fall risk screen, instruct family/caregiver to ask for assistance with transferring infant if caregiver noted to have fall risk factors     Problem: Skin/Tissue Integrity  Goal: Absence of new skin breakdown  Description: 1. Monitor for areas of redness and/or skin breakdown  2. Assess vascular access sites hourly  3. Every 4-6 hours minimum:  Change oxygen saturation probe site  4.   Every 4-6 hours:  If on nasal continuous positive airway pressure, respiratory therapy assess nares and determine need for appliance change or resting period. Outcome: Progressing  Note: No new skin breakdown this shift     Problem: Gastrointestinal - Adult  Goal: Minimal or absence of nausea and vomiting  Outcome: Progressing  Flowsheets (Taken 12/22/2022 1111 by Rom Locke RN)  Minimal or absence of nausea and vomiting:   Administer IV fluids as ordered to ensure adequate hydration   Administer ordered antiemetic medications as needed   Advance diet as tolerated, if ordered  Goal: Maintains or returns to baseline bowel function  Outcome: Progressing  Flowsheets (Taken 12/22/2022 1111 by Rom Locke RN)  Maintains or returns to baseline bowel function:   Assess bowel function   Encourage oral fluids to ensure adequate hydration   Administer ordered medications as needed  Goal: Maintains adequate nutritional intake  Outcome: Progressing  Flowsheets (Taken 12/22/2022 1111 by Rom Locke RN)  Maintains adequate nutritional intake:   Monitor percentage of each meal consumed   Monitor intake and output, weight and lab values   Identify factors contributing to decreased intake, treat as appropriate   Assist with meals as needed   Care plan reviewed with patient. Patient verbalize understanding of the plan of care and contribute to goal setting.

## 2022-12-23 NOTE — PROGRESS NOTES
Hospitalist Progress Note    Patient:  Ankur Edgar      Unit/Bed:4K-02/002-A    YOB: 1960    MRN: [id number]       Acct: [de-identified]     PCP: Rudy Bell MD    Date of Admission: 12/21/2022    Active Hospital Problems    Diagnosis Date Noted    Abdominal pain [R10.9] 12/21/2022     Priority: Medium     Assessment/Plan:    # Duodenal Ulcer Complicated by Bleeding SMA s/p Successful Angiography with Embolization. CT abdomen pelvis shows 11.4 x 9.6 cm area of abnormal density in the midabdomen suspicious for active bleeding possibly arising from the gastroduodenal artery. Embolization done by IR on 12/21/22. Cont Protonix drip x 72 hrs. Cont to trend H&H q 12 hrs. Transfuse if Hb < 7. Avoid all blood thinners. Discharge on Protonix 40 BID with GI follow-up for outpatient EGD. # Acute Blood Loss Anemia 2/2 Above. Hb baseline 12, dropped from 10 to 9.2 today. Cont to trend H&H q 12 hrs. Transfuse if Hb < 7. Avoid all blood thinners. # Hypotension secondary to acute blood loss. SBP noted to be low 80s initially, improved with NS bolus. Currently BP improving. Cont to trend daily. # Hx of GERD. Protonix 40 BID at discharge, will need GI follow-up for outpatient EGD. # Hx of irritable bowel syndrome. Noted per chart review. Continue home meds when able. Dispo: advance diet today, pt currently on her last day of IV Protonix drip. Hb down from 10 to 9.2 today. If Hb stable by tomorrow, discharge on Protonix 40 BID with GI follow-up for outpatient EGD. Chief Complaint: abdominal pain     Hospital Course: Declan Brumfield is a 64 y/o female with a PMHx of IBS, Hypothyroidism who presents to [facility] ED today for abdominal pain. Pt states she had some pain in the right side of her stomach this past Thursday and Friday then resolved, however the pain recurred yesterday and has been persistent, therefore she came in to be evaluated today.  Pt reports the pain is constant, but worse when she eats. Pain is improved with with lying on her right site and rest. Pt reports associated chills, nausea, dry heaves, and 2 episodes of vomiting while in the ED. She denies fever, hematemesis, hematochezia, melena. 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 Subjective: no acute events overnight. Sitting up in chair, continues to have mild abdominal discomfort. No nausea or vomiting. No fevers or chills. No chest pain or sob. Tolerating CLD, will advance diet. Medications:  Reviewed    Infusion Medications    sodium chloride      lactated ringers 75 mL/hr at 12/23/22 4211    pantoprazole 8 mg/hr (12/23/22 4753)     Scheduled Medications    colestipol  2 g Oral Daily with breakfast    [Held by provider] aspirin  81 mg Oral Daily    buPROPion  150 mg Oral BID    cetirizine  10 mg Oral Nightly    levothyroxine  50 mcg Oral Daily    [Held by provider] metoprolol succinate  50 mg Oral Daily    montelukast  10 mg Oral Nightly    sodium chloride flush  10 mL IntraVENous 2 times per day     PRN Meds: sodium chloride flush, sodium chloride, polyethylene glycol, acetaminophen **OR** acetaminophen, ondansetron **OR** ondansetron, potassium chloride **OR** potassium alternative oral replacement **OR** potassium chloride, magnesium sulfate, hydrALAZINE, HYDROcodone-acetaminophen      Intake/Output Summary (Last 24 hours) at 12/23/2022 1134  Last data filed at 12/23/2022 0858  Gross per 24 hour   Intake 2853.83 ml   Output 6200 ml   Net -3346.17 ml         Diet:  ADULT DIET; Full Liquid; Lactose-Controlled    Exam:  /61   Pulse 90   Temp 98.9 °F (37.2 °C) (Axillary)   Resp 18   Ht 5' 3\" (1.6 m)   Wt 135 lb 4.8 oz (61.4 kg)   SpO2 100%   BMI 23.97 kg/m²     General appearance: No apparent distress, appears stated age and cooperative. HEENT: Pupils equal, round, and reactive to light. Conjunctivae/corneas clear. Neck: Supple, with full range of motion. No jugular venous distention.  Trachea midline. Respiratory:  Normal respiratory effort. Clear to auscultation, bilaterally without Rales/Wheezes/Rhonchi. Cardiovascular: Regular rate and rhythm with normal S1/S2 without murmurs, rubs or gallops. Abdomen: Soft, non-tender, non-distended with normal bowel sounds. Musculoskeletal: passive and active ROM x 4 extremities. Skin: Skin color, texture, turgor normal.  No rashes or lesions. Neurologic:  Neurovascularly intact without any focal sensory/motor deficits. Cranial nerves: II-XII intact, grossly non-focal.  Psychiatric: Alert and oriented, thought content appropriate, normal insight  Capillary Refill: Brisk,< 3 seconds   Peripheral Pulses: +2 palpable, equal bilaterally       Labs:   Recent Labs     12/22/22  0419 12/22/22  1402 12/23/22  0325   WBC 7.5  --   --    HGB 10.0*   < > 9.2*   HCT 29.6*   < > 27.3*     --   --     < > = values in this interval not displayed. Recent Labs     12/23/22  0325      K 3.9      CO2 25   BUN 5*   CREATININE 0.7   CALCIUM 8.7       Recent Labs     12/21/22  0945 12/22/22  0419 12/23/22  0325   AST 18   < > 18   ALT 17   < > 13   BILIDIR <0.2  --   --    BILITOT 0.4   < > 0.5   ALKPHOS 106   < > 75    < > = values in this interval not displayed. Recent Labs     12/22/22  0419   INR 1.02       No results for input(s): Vicenta Miguel in the last 72 hours. Urinalysis:    No results found for: Stephanie Murdock, [address] [facility] [address], [address] [facility] [address], [facility] [address]    Radiology: All imaging reviewed     Diet: ADULT DIET; Full Liquid;  Lactose-Controlled      Code Status: Full Code              Electronically signed by Kj Napoles MD on 12/23/2022 at 11:34 AM

## 2022-12-23 NOTE — CARE COORDINATION
12/23/22, 8:35 AM EST    Patient goals/plan/ treatment preferences discussed by  and . Patient goals/plan/ treatment preferences reviewed with patient/ family. Patient/ family verbalize understanding of discharge plan and are in agreement with goal/plan/treatment preferences. Understanding was demonstrated using the teach back method. AVS provided by RN at time of discharge, which includes all necessary medical information pertaining to the patients current course of illness, treatment, post-discharge goals of care, and treatment preferences.      Services At/After Discharge: None  Plans home alone when medically cleared (12/21 angiography with embolization of bleeding branch of SMA, hope to advance diet); no anticipated needs

## 2022-12-23 NOTE — FLOWSHEET NOTE
12/23/22 3617   Safe Environment   Safety Measures Other (comment)  (Virtual nurse round complete)   Virtual nurse introduced via audio. Patient responds to audio stating no needs at this time. States nurse was just in the room and is coming right back. Asked that this virtual nurse check back in with patient later. No other needs stated at this time.

## 2022-12-24 VITALS
DIASTOLIC BLOOD PRESSURE: 61 MMHG | TEMPERATURE: 97.9 F | OXYGEN SATURATION: 100 % | WEIGHT: 141.2 LBS | HEIGHT: 63 IN | SYSTOLIC BLOOD PRESSURE: 129 MMHG | RESPIRATION RATE: 18 BRPM | BODY MASS INDEX: 25.02 KG/M2 | HEART RATE: 94 BPM

## 2022-12-24 LAB
ALBUMIN SERPL-MCNC: 3.5 G/DL (ref 3.5–5.1)
ALP BLD-CCNC: 76 U/L (ref 38–126)
ALT SERPL-CCNC: 13 U/L (ref 11–66)
ANION GAP SERPL CALCULATED.3IONS-SCNC: 11 MEQ/L (ref 8–16)
AST SERPL-CCNC: 19 U/L (ref 5–40)
BASOPHILS # BLD: 0.6 %
BASOPHILS ABSOLUTE: 0 THOU/MM3 (ref 0–0.1)
BILIRUB SERPL-MCNC: 0.5 MG/DL (ref 0.3–1.2)
BUN BLDV-MCNC: 7 MG/DL (ref 7–22)
CALCIUM IONIZED: 1.18 MMOL/L (ref 1.12–1.32)
CALCIUM SERPL-MCNC: 8.2 MG/DL (ref 8.5–10.5)
CHLORIDE BLD-SCNC: 105 MEQ/L (ref 98–111)
CO2: 26 MEQ/L (ref 23–33)
CREAT SERPL-MCNC: 0.8 MG/DL (ref 0.4–1.2)
EOSINOPHIL # BLD: 1.1 %
EOSINOPHILS ABSOLUTE: 0.1 THOU/MM3 (ref 0–0.4)
ERYTHROCYTE [DISTWIDTH] IN BLOOD BY AUTOMATED COUNT: 12.7 % (ref 11.5–14.5)
ERYTHROCYTE [DISTWIDTH] IN BLOOD BY AUTOMATED COUNT: 39.8 FL (ref 35–45)
GFR SERPL CREATININE-BSD FRML MDRD: > 60 ML/MIN/1.73M2
GLUCOSE BLD-MCNC: 99 MG/DL (ref 70–108)
HCT VFR BLD CALC: 27.8 % (ref 37–47)
HCT VFR BLD CALC: 28.2 % (ref 37–47)
HEMOGLOBIN: 9.6 GM/DL (ref 12–16)
HEMOGLOBIN: 9.7 GM/DL (ref 12–16)
IMMATURE GRANS (ABS): 0.01 THOU/MM3 (ref 0–0.07)
IMMATURE GRANULOCYTES: 0.2 %
LYMPHOCYTES # BLD: 36.9 %
LYMPHOCYTES ABSOLUTE: 2 THOU/MM3 (ref 1–4.8)
MAGNESIUM: 1.9 MG/DL (ref 1.6–2.4)
MCH RBC QN AUTO: 29.8 PG (ref 26–33)
MCHC RBC AUTO-ENTMCNC: 34.4 GM/DL (ref 32.2–35.5)
MCV RBC AUTO: 86.5 FL (ref 81–99)
MONOCYTES # BLD: 9 %
MONOCYTES ABSOLUTE: 0.5 THOU/MM3 (ref 0.4–1.3)
NUCLEATED RED BLOOD CELLS: 0 /100 WBC
PLATELET # BLD: 154 THOU/MM3 (ref 130–400)
PMV BLD AUTO: 10.7 FL (ref 9.4–12.4)
POTASSIUM REFLEX MAGNESIUM: 3.5 MEQ/L (ref 3.5–5.2)
RBC # BLD: 3.26 MILL/MM3 (ref 4.2–5.4)
SEG NEUTROPHILS: 52.2 %
SEGMENTED NEUTROPHILS ABSOLUTE COUNT: 2.8 THOU/MM3 (ref 1.8–7.7)
SODIUM BLD-SCNC: 142 MEQ/L (ref 135–145)
TOTAL PROTEIN: 5 G/DL (ref 6.1–8)
WBC # BLD: 5.3 THOU/MM3 (ref 4.8–10.8)

## 2022-12-24 PROCEDURE — 36415 COLL VENOUS BLD VENIPUNCTURE: CPT

## 2022-12-24 PROCEDURE — 85014 HEMATOCRIT: CPT

## 2022-12-24 PROCEDURE — 6370000000 HC RX 637 (ALT 250 FOR IP)

## 2022-12-24 PROCEDURE — 83735 ASSAY OF MAGNESIUM: CPT

## 2022-12-24 PROCEDURE — 85025 COMPLETE CBC W/AUTO DIFF WBC: CPT

## 2022-12-24 PROCEDURE — 82330 ASSAY OF CALCIUM: CPT

## 2022-12-24 PROCEDURE — 80053 COMPREHEN METABOLIC PANEL: CPT

## 2022-12-24 PROCEDURE — 85018 HEMOGLOBIN: CPT

## 2022-12-24 RX ORDER — PANTOPRAZOLE SODIUM 40 MG/1
40 TABLET, DELAYED RELEASE ORAL 2 TIMES DAILY
Qty: 30 TABLET | Refills: 1 | Status: SHIPPED | OUTPATIENT
Start: 2022-12-24

## 2022-12-24 RX ORDER — PANTOPRAZOLE SODIUM 40 MG/1
40 TABLET, DELAYED RELEASE ORAL 2 TIMES DAILY
Qty: 30 TABLET | Refills: 1 | Status: SHIPPED | OUTPATIENT
Start: 2022-12-24 | End: 2022-12-24 | Stop reason: SDUPTHER

## 2022-12-24 RX ADMIN — BUPROPION HYDROCHLORIDE 150 MG: 150 TABLET, EXTENDED RELEASE ORAL at 09:03

## 2022-12-24 RX ADMIN — LEVOTHYROXINE SODIUM 50 MCG: 0.05 TABLET ORAL at 05:53

## 2022-12-24 RX ADMIN — POTASSIUM CHLORIDE 40 MEQ: 1500 TABLET, EXTENDED RELEASE ORAL at 09:03

## 2022-12-24 RX ADMIN — ACETAMINOPHEN 650 MG: 325 TABLET ORAL at 03:56

## 2022-12-24 ASSESSMENT — PAIN DESCRIPTION - LOCATION: LOCATION: HEAD

## 2022-12-24 ASSESSMENT — PAIN SCALES - GENERAL: PAINLEVEL_OUTOF10: 0

## 2022-12-24 NOTE — PROGRESS NOTES
DC AVS gone over with pt, via teachback method,  who voiced no questions or concerns about follow up appts, medications to start, medications to stop if applicable, and when to seek a higher level of care.

## 2022-12-24 NOTE — DISCHARGE SUMMARY
Discharge Summary     Patient Identification:  Missy Turk  : 1960  MRN: [id number]   Account: [de-identified]     Admit date: 2022  Discharge date: 22   Attending provider: Katya Bhatt MD        Primary care provider: Mary Jane Wylie MD     Discharge Diagnoses:   Principal Problem:    Abdominal pain  Resolved Problems:    * No resolved hospital problems. 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 Embolization done by IR on 22. Plan:  -Cont Protonix drip x 72 hrs.   -Cont to trend H&H q 12 hrs.   -Transfuse if Hb < 7. Avoid all blood thinners.   -Discharge on Protonix 40 BID. -GI follow-up for outpatient EGD.   -Discussed with patient food restrictions such as caffeine, spicy food, and chocolate. An informational pamphlet was provided to the patient     - Acute Blood Loss Anemia 2/2 Above: Most recent Hg, 9.6. Hb baseline 12.   -Dropped from 10.6 evening of 22 to 9.6 morning 22.  -Hg 22 9.2, 10.0  -Cont to trend H&H q 12 hrs.   -Transfuse if Hb < 7.   -Avoid all blood thinners. -CBC 1 week following D/C and follow up with PCP     - Hypotension 2/2 acute blood loss: Improving. Most recent BP, 115/54  -SBP noted to be low 80s initially, improved with NS bolus.   -Currently BP improving.   -Continue to trend daily.   -Restart metoprolol for her tachycardia up d/c     - GERD:   -Protonix 40 BID at discharge  -GI follow-up for outpatient EGD. - Irritable bowel syndrome:Noted per chart review.    -Continue home meds when able. Hospital Course:   Per HPI, Ainsley Martines is a 64 y/o female with a PMHx of IBS, Hypothyroidism who presents to [facility] ED today for abdominal pain.  Pt states she had some pain in the right side of her stomach this past Thursday and Friday then resolved, however the pain recurred yesterday and has been persistent, therefore she came in to be evaluated today. Pt reports the pain is constant, but worse when she eats. Pain is improved with with lying on her right site and rest. Pt reports associated chills, nausea, dry heaves, and 2 episodes of vomiting while in the ED. She denies fever, hematemesis, hematochezia, melena. 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 \"   -CT abdomen pelvis with IV contrast obtained on 12/21/2022 showed 11.4 x 9.6 cm area of abnormal density in the mid abdomen suspicious for active bleeding possibly arising from the gastroduodenal artery. Diffuse fatty liver disease. Punctuate calcification spleen. 4.9 cm left renal cyst.  -IR angiogram SMA was performed on 12/21/2022 showed abdominal aorta and gastroduodenal artery were unremarkable. Patient is status post successful pseudoaneurysm embolization mending from a proximal third order branch of the SMA. Incidentally a small amount of nonocclusive thrombus developed in a few SMA branches during the procedure. These are expected to resolve spontaneously per IR  -12/23/22: Patient's diet was advanced, she is on a regular lactose controlled diet at this time. Subjective, Last 24 hrs:  Patient is evaluated at bedside this morning. Vitals stable. Patient states she is doing well today. She has noticed improvement in her abdominal pain. Patient was switched to a regular diet from clear liquids yesterday. Patient states she tolerated diet well and ate her entire breakfast without any discomfort or trouble. Patient states she does have some abdominal tenderness in the right mid lower quadrant. Patient denies fever, chills, shortness of breath, difficulty breathing, chest pain, nausea, vomiting, abdominal pain. Patient is not having any diarrhea or constipation at this time.   Patient reports she is having bowel movements and they have not been black tarry stool or seen blood in or around the stool. Discussed with patient discharge home today. Discharge Medications:     Medication List        CHANGE how you take these medications      pantoprazole 40 MG tablet  Commonly known as: PROTONIX  Take 1 tablet by mouth in the morning and at bedtime  What changed:   how much to take  when to take this            CONTINUE taking these medications      ALPRAZolam 0.5 MG tablet  Commonly known as: XANAX     azelastine 0.1 % nasal spray  Commonly known as: ASTELIN     buPROPion 150 MG extended release tablet  Commonly known as: WELLBUTRIN SR     CALCIUM 600 PO     cetirizine 10 MG tablet  Commonly known as: ZYRTEC     colestipol 1 g tablet  Commonly known as: COLESTID     levothyroxine 50 MCG tablet  Commonly known as: SYNTHROID     MAGNESIUM GLYCINATE PLUS PO     metoprolol succinate 50 MG extended release tablet  Commonly known as: TOPROL XL  TAKE 1 TABLET BY MOUTH ONCE DAILY     montelukast 10 MG tablet  Commonly known as: SINGULAIR     traZODone 50 MG tablet  Commonly known as: DESYREL     vitamin E 400 UNIT capsule            STOP taking these medications      aspirin 81 MG tablet     LIBRAX PO     NONFORMULARY               Where to Get Your Medications        These medications were sent to [facility] , [address]  [address] [facility] [address]      Phone: [phone]   pantoprazole 40 MG tablet       Patient Instructions:    Discharge lab work: CBC. Activity: Dietary restriction. Pamphlet was provided to the patient  Diet: ADULT DIET; Regular; Lactose-Controlled    Code Status: Full Code    Follow-up visits:   [facility]  [address] Byers [address] [place] [address]  [phone]    Schedule an appointment as soon as possible for a visit in 1 week(s)  Follow-up with PCP in 1 week. Please get CBC done prior to discuss lab work with your PCP.     [address] [facility] [address]  [phone]  Call in 2 week(s)  Call to make you appointment  Follow-up in 2 to 3 weeks with outpatient GI for duodenal ulcer with SMA bleed and schedule EGD     Procedures: Embolization of third order branch of proximal SMA    Consults:   Interventional radiology    Examination:  Vitals:  Vitals:    12/23/22 2340 12/24/22 0345 12/24/22 0900 12/24/22 1237   BP: (!) 119/56 (!) 120/56 (!) 115/54 129/61   Pulse: 88 84 95 94   Resp: 17 18 16 18   Temp: 98.8 °F (37.1 °C) 99 °F (37.2 °C) 98 °F (36.7 °C) 97.9 °F (36.6 °C)   TempSrc: Oral Oral Oral Oral   SpO2: 95% 96% 100% 100%   Weight:  141 lb 3.2 oz (64 kg)     Height:         Weight: Weight: 141 lb 3.2 oz (64 kg)     24 hour intake/output:  Intake/Output Summary (Last 24 hours) at 12/24/2022 1315  Last data filed at 12/24/2022 1230  Gross per 24 hour   Intake 2032.16 ml   Output 4050 ml   Net -2017.84 ml     General appearance - alert, well appearing, and in no distress, oriented to person, place, and time, normal appearing weight, and acyanotic, in no respiratory distress  Chest - clear to auscultation, no wheezes, rales or rhonchi, symmetric air entry, no tachypnea, retractions or cyanosis  Heart - normal rate, regular rhythm, normal S1, S2, no murmurs, rubs, clicks or gallops  Abdomen - soft, nontender, nondistended, no masses or organomegaly  no rebound tenderness noted  bowel sounds normal  Obese: No; Protuberant: No   Neurological - alert, oriented, normal speech, no focal findings or movement disorder noted, cranial nerves II through XII intact  Extremities - peripheral pulses normal, no pedal edema, no clubbing or cyanosis  Skin - normal coloration and turgor, no rashes, no suspicious skin lesions noted    Significant Diagnostics:   Radiology: CT ABDOMEN PELVIS W IV CONTRAST Additional Contrast? None    Result Date: 12/21/2022  PROCEDURE: CT ABDOMEN PELVIS W IV CONTRAST CLINICAL INFORMATION: RUQ pain . COMPARISON: None. TECHNIQUE: Axial 5 mm CT images were obtained through the abdomen and pelvis after the administration of intravenous contrast. Coronal and sagittal reconstructions were obtained. All CT scans at this facility use dose modulation, iterative reconstruction, and/or weight-based dosing when appropriate to reduce radiation dose to as low as reasonably achievable. FINDINGS: The visualized aspects of the lung bases are clear. The base of the heart is within appropriate limits. There is mild diffuse fatty replacement in the liver. . There is punctate calcification in the spleen. The adrenals and pancreas are normal. The gallbladder is normal.    There is no hydronephrosis or stones of either kidney. There is a 4.9 cm left renal cyst.  There is an 11.4 x 9.6 cm area of abnormal density in the midabdomen. This is suspicious for active hemorrhage possibly arising from the gastroduodenal artery. Urgent arteriogram and possible embolization may be indicated. No abnormalities of the small bowel loops are noted. The IVC and aorta are of normal caliber. There is no adenopathy. The urinary bladder is normal. There is no pelvic free fluid. The colon is within normal limits. There is no adenopathy. No suspicious osseous lesions are identified. 1. 11.4 x 9.6 cm area of abnormal density in the midabdomen suspicious for active bleeding possibly arising from the gastroduodenal artery. Emergent arteriogram and possible embolization may be indicated. 2. Mild diffuse fatty replacement liver. 3. Punctate calcification in the spleen. 4. 4.9 cm left renal cyst. 5. Otherwise negative CT scan of the abdomen and pelvis. . **This report has been created using voice recognition software. It may contain minor errors which are inherent in voice recognition technology. ** Final report electronically signed by DR Gayla Landau on 12/21/2022 11:26 AM    IR ANGIOGRAM SUPERIOR MESENTERIC    Result Date: 12/21/2022  PROCEDURE: MESENTERIC ANGIOGRAPHY/and embolization CLINICAL INFORMATION: Spontaneous abdominal bleeding PERFORMED BY:  Dr. Kodak Jama MD APPROACH: Right common femoral artery, micropuncture technique. . Access was obtained with ultrasound guidance. A permanent sonographic image was obtained for documentation. CATHETERS: 5 [employer] VCF, [facility] S[facility],, 3 [facility] Renegade microcatheter EMBOLIZATION MATERIALS: 300-500 um embospheres microparticles. VESSELS CATHETERIZED: Upper abdominal aorta, celiac artery, gastroduodenal artery, SMA, unnamed third order branch of the proximal SMA. DIAGNOSTIC PROCEDURES: Abdominal aortogram, selective celiac and SMA angiography. INTERVENTIONS: Embolization unnamed third order branch of the proximal SMA with pseudoaneurysm FLUOROSCOPY TIME: 39 minutes 6 seconds FLUOROSCOPIC IMAGES: 260 SEDATION: Versed 1 mg; fentanyl 50 mcg, IV; the patient was sedated during this procedure,  and monitored with EKG and pulse ox monitoring devices by a registered nurse. Face-to-face time with patient 135 minutes. OTHER MEDICATIONS: None . CONTRAST: 215 ml, Isovue-300. CLOSURE: Angio-Seal device, successful without complication. ESTIMATED BLOOD LOSS: Minimal TECHNIQUE: Signed informed consent was obtained prior to performing this procedure. The patient was sedated, as indicated above. Access was obtained and a 5 [employer] vascular sheath was inserted. The procedures as above were then performed. The abdominal aorta is unremarkable. 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 This may catheterization of this vessel very difficult. Extensive imaging of the celiac artery and gastroduodenal artery was performed, revealing no active bleeding. Attention was then directed to the SMA. The pseudoaneurysm was quickly identified emanating from a proximal third order branch of the SMA.  This third order branch was successfully catheterized with a 3 [place] Renegade microcatheter. Due to the small size  of the branch vessel, coil embolization could not be performed. The pseudoaneurysm was embolized with embospheres microparticles. . Completion images revealed opacification of the pseudoaneurysm by the embospheres and contain contrast material. The feeding branch was also seen to be completely embolized. 1 . Abdominal aorta and gastroduodenal artery unremarkable. . 2. Status post successful pseudoaneurysm embolization emanating from a proximal third order branch of the SMA. 3. Incidentally, a small amount of nonocclusive thrombus developed and a few SMA branches during this procedure. These are expected to resolve spontaneously. These findings were communicated to the hospitalist in charge of this patient, prior to the patient leaving the IR suite. . **This report has been created using voice recognition software. It may contain minor errors which are inherent in voice recognition technology. ** Final report electronically signed by Dr. Marisel Lott on 12/21/2022 4:35 PM  Labs:   Recent Results (from the past 72 hour(s))   Hemoglobin and Hematocrit    Collection Time: 12/21/22  4:25 PM   Result Value Ref Range    Hemoglobin 10.9 (L) 12.0 - 16.0 gm/dl    Hematocrit 32.1 (L) 37.0 - 47.0 %   Lactic acid, plasma    Collection Time: 12/21/22  4:25 PM   Result Value Ref Range    Lactic Acid 1.8 0.5 - 2.0 mmol/L   Hemoglobin and Hematocrit    Collection Time: 12/21/22 10:19 PM   Result Value Ref Range    Hemoglobin 10.3 (L) 12.0 - 16.0 gm/dl    Hematocrit 30.1 (L) 37.0 - 47.0 %   Protime-INR    Collection Time: 12/22/22  4:19 AM   Result Value Ref Range    INR 1.02 0.85 - 1.13   APTT    Collection Time: 12/22/22  4:19 AM   Result Value Ref Range    aPTT 27.9 22.0 - 38.0 seconds   Iron and TIBC    Collection Time: 12/22/22  4:19 AM   Result Value Ref Range    Iron 76 50 - 170 ug/dL    TIBC 226 171 - 450 ug/dL   Comprehensive Metabolic Panel w/ Reflex to MG    Collection Time: 12/22/22  4:19 AM   Result Value Ref Range    Glucose 98 70 - 108 mg/dL    Creatinine 0.8 0.4 - 1.2 mg/dL    BUN 8 7 - 22 mg/dL    Sodium 141 135 - 145 meq/L    Potassium reflex Magnesium 4.2 3.5 - 5.2 meq/L    Chloride 106 98 - 111 meq/L    CO2 25 23 - 33 meq/L    Calcium 8.6 8.5 - 10.5 mg/dL    AST 16 5 - 40 U/L    Alkaline Phosphatase 82 38 - 126 U/L    Total Protein 5.2 (L) 6.1 - 8.0 g/dL    Albumin 3.5 3.5 - 5.1 g/dL    Total Bilirubin 0.5 0.3 - 1.2 mg/dL    ALT 13 11 - 66 U/L   CBC with Auto Differential    Collection Time: 12/22/22  4:19 AM   Result Value Ref Range    WBC 7.5 4.8 - 10.8 thou/mm3    RBC 3.40 (L) 4.20 - 5.40 mill/mm3    Hemoglobin 10.0 (L) 12.0 - 16.0 gm/dl    Hematocrit 29.6 (L) 37.0 - 47.0 %    MCV 87.1 81.0 - 99.0 fL    MCH 29.4 26.0 - 33.0 pg    MCHC 33.8 32.2 - 35.5 gm/dl    RDW-CV 13.0 11.5 - 14.5 %    RDW-SD 41.3 35.0 - 45.0 fL    Platelets 252 059 - 965 thou/mm3    MPV 10.7 9.4 - 12.4 fL    Seg Neutrophils 64.5 %    Lymphocytes 25.4 %    Monocytes 9.3 %    Eosinophils 0.4 %    Basophils 0.3 %    Immature Granulocytes 0.1 %    Segs Absolute 4.8 1.8 - 7.7 thou/mm3    Lymphocytes Absolute 1.9 1.0 - 4.8 thou/mm3    Monocytes Absolute 0.7 0.4 - 1.3 thou/mm3    Eosinophils Absolute 0.0 0.0 - 0.4 thou/mm3    Basophils Absolute 0.0 0.0 - 0.1 thou/mm3    Immature Grans (Abs) 0.01 0.00 - 0.07 thou/mm3    nRBC 0 /100 wbc   Anion Gap    Collection Time: 12/22/22  4:19 AM   Result Value Ref Range    Anion Gap 10.0 8.0 - 16.0 meq/L   Glomerular Filtration Rate, Estimated    Collection Time: 12/22/22  4:19 AM   Result Value Ref Range    Est, Glom Filt Rate >60 >60 ml/min/1.73m2   Hemoglobin and Hematocrit    Collection Time: 12/22/22  2:02 PM   Result Value Ref Range    Hemoglobin 9.9 (L) 12.0 - 16.0 gm/dl    Hematocrit 29.4 (L) 37.0 - 47.0 %   Hemoglobin and Hematocrit    Collection Time: 12/22/22  9:39 PM   Result Value Ref Range    Hemoglobin 10.0 (L) 12.0 - 16.0 gm/dl    Hematocrit 28.8 (L) 37.0 - 47.0 %   Comprehensive Metabolic Panel w/ Reflex to MG    Collection Time: 12/23/22  3:25 AM   Result Value Ref Range    Glucose 101 70 - 108 mg/dL    Creatinine 0.7 0.4 - 1.2 mg/dL    BUN 5 (L) 7 - 22 mg/dL    Sodium 145 135 - 145 meq/L    Potassium reflex Magnesium 3.9 3.5 - 5.2 meq/L    Chloride 108 98 - 111 meq/L    CO2 25 23 - 33 meq/L    Calcium 8.7 8.5 - 10.5 mg/dL    AST 18 5 - 40 U/L    Alkaline Phosphatase 75 38 - 126 U/L    Total Protein 5.2 (L) 6.1 - 8.0 g/dL    Albumin 3.2 (L) 3.5 - 5.1 g/dL    Total Bilirubin 0.5 0.3 - 1.2 mg/dL    ALT 13 11 - 66 U/L   Hemoglobin and Hematocrit    Collection Time: 12/23/22  3:25 AM   Result Value Ref Range    Hemoglobin 9.2 (L) 12.0 - 16.0 gm/dl    Hematocrit 27.3 (L) 37.0 - 47.0 %   Anion Gap    Collection Time: 12/23/22  3:25 AM   Result Value Ref Range    Anion Gap 12.0 8.0 - 16.0 meq/L   Glomerular Filtration Rate, Estimated    Collection Time: 12/23/22  3:25 AM   Result Value Ref Range    Est, Glom Filt Rate >60 >60 ml/min/1.73m2   Hemoglobin and Hematocrit    Collection Time: 12/23/22  5:46 PM   Result Value Ref Range    Hemoglobin 10.6 (L) 12.0 - 16.0 gm/dl    Hematocrit 30.3 (L) 37.0 - 47.0 %   Comprehensive Metabolic Panel w/ Reflex to MG    Collection Time: 12/24/22  3:56 AM   Result Value Ref Range    Glucose 99 70 - 108 mg/dL    Creatinine 0.8 0.4 - 1.2 mg/dL    BUN 7 7 - 22 mg/dL    Sodium 142 135 - 145 meq/L    Potassium reflex Magnesium 3.5 3.5 - 5.2 meq/L    Chloride 105 98 - 111 meq/L    CO2 26 23 - 33 meq/L    Calcium 8.2 (L) 8.5 - 10.5 mg/dL    AST 19 5 - 40 U/L    Alkaline Phosphatase 76 38 - 126 U/L    Total Protein 5.0 (L) 6.1 - 8.0 g/dL    Albumin 3.5 3.5 - 5.1 g/dL    Total Bilirubin 0.5 0.3 - 1.2 mg/dL    ALT 13 11 - 66 U/L   Hemoglobin and Hematocrit    Collection Time: 12/24/22  3:56 AM   Result Value Ref Range    Hemoglobin 9.6 (L) 12.0 - 16.0 gm/dl    Hematocrit 27.8 (L) 37.0 - 47.0 %   Anion Gap Collection Time: 12/24/22  3:56 AM   Result Value Ref Range    Anion Gap 11.0 8.0 - 16.0 meq/L   Glomerular Filtration Rate, Estimated    Collection Time: 12/24/22  3:56 AM   Result Value Ref Range    Est, Glom Filt Rate >60 >60 ml/min/1.73m2   Magnesium    Collection Time: 12/24/22  3:56 AM   Result Value Ref Range    Magnesium 1.9 1.6 - 2.4 mg/dL   Calcium, Ionized    Collection Time: 12/24/22  7:35 AM   Result Value Ref Range    Calcium, Ionized 1.18 1.12 - 1.32 mmol/L   CBC with Auto Differential    Collection Time: 12/24/22  7:35 AM   Result Value Ref Range    WBC 5.3 4.8 - 10.8 thou/mm3    RBC 3.26 (L) 4.20 - 5.40 mill/mm3    Hemoglobin 9.7 (L) 12.0 - 16.0 gm/dl    Hematocrit 28.2 (L) 37.0 - 47.0 %    MCV 86.5 81.0 - 99.0 fL    MCH 29.8 26.0 - 33.0 pg    MCHC 34.4 32.2 - 35.5 gm/dl    RDW-CV 12.7 11.5 - 14.5 %    RDW-SD 39.8 35.0 - 45.0 fL    Platelets 140 305 - 069 thou/mm3    MPV 10.7 9.4 - 12.4 fL    Seg Neutrophils 52.2 %    Lymphocytes 36.9 %    Monocytes 9.0 %    Eosinophils 1.1 %    Basophils 0.6 %    Immature Granulocytes 0.2 %    Segs Absolute 2.8 1.8 - 7.7 thou/mm3    Lymphocytes Absolute 2.0 1.0 - 4.8 thou/mm3    Monocytes Absolute 0.5 0.4 - 1.3 thou/mm3    Eosinophils Absolute 0.1 0.0 - 0.4 thou/mm3    Basophils Absolute 0.0 0.0 - 0.1 thou/mm3    Immature Grans (Abs) 0.01 0.00 - 0.07 thou/mm3    nRBC 0 /100 wbc     Discharge condition: Stable  Disposition: Home  Time spent on discharge: 35 minutes    Electronically signed by Chin Kurtz DO on 12/24/2022 at 1:15 PM   This case was discussed with attending, Dr. Aakash Nguyễn MD

## 2022-12-24 NOTE — PLAN OF CARE
Problem: Discharge Planning  Goal: Discharge to home or other facility with appropriate resources  Outcome: Progressing  Flowsheets (Taken 12/24/2022 0129)  Discharge to home or other facility with appropriate resources:   Identify barriers to discharge with patient and caregiver   Arrange for needed discharge resources and transportation as appropriate   Identify discharge learning needs (meds, wound care, etc)   Refer to discharge planning if patient needs post-hospital services based on physician order or complex needs related to functional status, cognitive ability or social support system     Problem: Pain  Goal: Verbalizes/displays adequate comfort level or baseline comfort level  Outcome: Progressing  Flowsheets (Taken 12/24/2022 0129)  Verbalizes/displays adequate comfort level or baseline comfort level:   Encourage patient to monitor pain and request assistance   Assess pain using appropriate pain scale     Problem: ABCDS Injury Assessment  Goal: Absence of physical injury  Outcome: Progressing  Flowsheets (Taken 12/24/2022 0129)  Absence of Physical Injury: Implement safety measures based on patient assessment     Problem: Safety - Adult  Goal: Free from fall injury  Outcome: Progressing  Flowsheets (Taken 12/24/2022 0129)  Free From Fall Injury:   Instruct family/caregiver on patient safety   Based on caregiver fall risk screen, instruct family/caregiver to ask for assistance with transferring infant if caregiver noted to have fall risk factors     Problem: Skin/Tissue Integrity  Goal: Absence of new skin breakdown  Description: 1. Monitor for areas of redness and/or skin breakdown  2. Assess vascular access sites hourly  3. Every 4-6 hours minimum:  Change oxygen saturation probe site  4. Every 4-6 hours:  If on nasal continuous positive airway pressure, respiratory therapy assess nares and determine need for appliance change or resting period.   Outcome: Progressing  Note: No new skin breakdown this shift. Problem: Gastrointestinal - Adult  Goal: Minimal or absence of nausea and vomiting  Outcome: Progressing  Flowsheets (Taken 12/24/2022 0129)  Minimal or absence of nausea and vomiting:   Administer IV fluids as ordered to ensure adequate hydration   Administer ordered antiemetic medications as needed  Goal: Maintains or returns to baseline bowel function  Outcome: Progressing  Flowsheets (Taken 12/24/2022 0129)  Maintains or returns to baseline bowel function:   Assess bowel function   Encourage oral fluids to ensure adequate hydration  Goal: Maintains adequate nutritional intake  Outcome: Progressing  Flowsheets (Taken 12/24/2022 0129)  Maintains adequate nutritional intake:   Monitor percentage of each meal consumed   Monitor intake and output, weight and lab values   Identify factors contributing to decreased intake, treat as appropriate   Care plan reviewed with patient. Patient verbalize understanding of the plan of care and contribute to goal setting.

## 2022-12-24 NOTE — PROGRESS NOTES
Patient discharged to home, patient's family to bring patient home via private vehicle. Transport takes patient down to discharge lobby via wheelchair. Discharge packet went over with patient including discharge instructions via [address] virtual nurse. Discharge instructions went over with patient include which appointments needing to be made, new medications / medication changes, and s/s to watch out for. Patient left with belongings and discharge packet. Patient's new medications were sent to [facility] in [place]   Patient's questions and concerns were addressed, patient leaves stable and ambulatory.

## 2022-12-25 PROBLEM — R58 INTRA ABDOMINAL HEMORRHAGE: Status: ACTIVE | Noted: 2022-12-25

## 2022-12-29 ENCOUNTER — HOSPITAL ENCOUNTER (OUTPATIENT)
Age: 62
Discharge: HOME | End: 2022-12-29
Payer: COMMERCIAL

## 2022-12-29 LAB
BASOPHILS # BLD: 0.5 %
BASOPHILS ABSOLUTE: 0 THOU/MM3 (ref 0–0.1)
EOSINOPHIL # BLD: 1.3 %
EOSINOPHILS ABSOLUTE: 0.1 THOU/MM3 (ref 0–0.4)
ERYTHROCYTE [DISTWIDTH] IN BLOOD BY AUTOMATED COUNT: 13.5 % (ref 11.5–14.5)
ERYTHROCYTE [DISTWIDTH] IN BLOOD BY AUTOMATED COUNT: 46 FL (ref 35–45)
HCT VFR BLD CALC: 32.9 % (ref 37–47)
HEMOGLOBIN: 10.5 GM/DL (ref 12–16)
IMMATURE GRANS (ABS): 0.02 THOU/MM3 (ref 0–0.07)
IMMATURE GRANULOCYTES: 0.4 %
LYMPHOCYTES # BLD: 23.1 %
LYMPHOCYTES ABSOLUTE: 1.3 THOU/MM3 (ref 1–4.8)
MCH RBC QN AUTO: 29.7 PG (ref 26–33)
MCHC RBC AUTO-ENTMCNC: 31.9 GM/DL (ref 32.2–35.5)
MCV RBC AUTO: 92.9 FL (ref 81–99)
MONOCYTES # BLD: 10.4 %
MONOCYTES ABSOLUTE: 0.6 THOU/MM3 (ref 0.4–1.3)
NUCLEATED RED BLOOD CELLS: 0 /100 WBC
PLATELET # BLD: 272 THOU/MM3 (ref 130–400)
PMV BLD AUTO: 11.1 FL (ref 9.4–12.4)
RBC # BLD: 3.54 MILL/MM3 (ref 4.2–5.4)
SEG NEUTROPHILS: 64.3 %
SEGMENTED NEUTROPHILS ABSOLUTE COUNT: 3.5 THOU/MM3 (ref 1.8–7.7)
WBC # BLD: 5.5 THOU/MM3 (ref 4.8–10.8)

## 2022-12-29 PROCEDURE — 85651 RBC SED RATE NONAUTOMATED: CPT

## 2022-12-29 PROCEDURE — 85025 COMPLETE CBC W/AUTO DIFF WBC: CPT

## 2022-12-29 PROCEDURE — 36415 COLL VENOUS BLD VENIPUNCTURE: CPT

## 2022-12-29 PROCEDURE — 80053 COMPREHEN METABOLIC PANEL: CPT

## 2022-12-30 LAB
ALBUMIN SERPL-MCNC: 3.8 G/DL (ref 3.5–5.1)
ALP BLD-CCNC: 88 U/L (ref 38–126)
ALT SERPL-CCNC: 17 U/L (ref 11–66)
ANION GAP SERPL CALCULATED.3IONS-SCNC: 11 MEQ/L (ref 8–16)
AST SERPL-CCNC: 21 U/L (ref 5–40)
BILIRUB SERPL-MCNC: 0.4 MG/DL (ref 0.3–1.2)
BUN BLDV-MCNC: 11 MG/DL (ref 7–22)
CALCIUM SERPL-MCNC: 9.2 MG/DL (ref 8.5–10.5)
CHLORIDE BLD-SCNC: 104 MEQ/L (ref 98–111)
CO2: 25 MEQ/L (ref 23–33)
CREAT SERPL-MCNC: 0.7 MG/DL (ref 0.4–1.2)
GFR SERPL CREATININE-BSD FRML MDRD: > 60 ML/MIN/1.73M2
GLUCOSE BLD-MCNC: 90 MG/DL (ref 70–108)
POTASSIUM SERPL-SCNC: 4.1 MEQ/L (ref 3.5–5.2)
SEDIMENTATION RATE, ERYTHROCYTE: 39 MM/HR (ref 0–20)
SODIUM BLD-SCNC: 140 MEQ/L (ref 135–145)
TOTAL PROTEIN: 6.3 G/DL (ref 6.1–8)

## 2023-02-27 ENCOUNTER — HOSPITAL ENCOUNTER (OUTPATIENT)
Dept: ULTRASOUND IMAGING | Age: 63
Discharge: HOME | End: 2023-02-27
Payer: COMMERCIAL

## 2023-02-27 DIAGNOSIS — N28.1 RENAL CYST, LEFT: ICD-10-CM

## 2023-02-27 PROCEDURE — 76770 US EXAM ABDO BACK WALL COMP: CPT

## 2023-03-17 RX ORDER — METOPROLOL SUCCINATE 50 MG/1
TABLET, EXTENDED RELEASE ORAL
Qty: 90 TABLET | Refills: 3 | Status: SHIPPED | OUTPATIENT
Start: 2023-03-17

## 2023-03-24 ENCOUNTER — HOSPITAL ENCOUNTER (OUTPATIENT)
Age: 63
Discharge: HOME | End: 2023-03-24
Payer: COMMERCIAL

## 2023-03-24 LAB
ALBUMIN SERPL BCG-MCNC: 4.4 G/DL (ref 3.5–5.1)
ALP SERPL-CCNC: 108 U/L (ref 38–126)
ALT SERPL W/O P-5'-P-CCNC: 12 U/L (ref 11–66)
ANION GAP SERPL CALC-SCNC: 10 MEQ/L (ref 8–16)
AST SERPL-CCNC: 17 U/L (ref 5–40)
BASOPHILS ABSOLUTE: 0 THOU/MM3 (ref 0–0.1)
BASOPHILS NFR BLD AUTO: 1 %
BILIRUB SERPL-MCNC: 0.4 MG/DL (ref 0.3–1.2)
BUN SERPL-MCNC: 10 MG/DL (ref 7–22)
CALCIUM SERPL-MCNC: 9.4 MG/DL (ref 8.5–10.5)
CHLORIDE SERPL-SCNC: 106 MEQ/L (ref 98–111)
CHOLEST SERPL-MCNC: 194 MG/DL (ref 100–199)
CO2 SERPL-SCNC: 27 MEQ/L (ref 23–33)
CREAT SERPL-MCNC: 0.8 MG/DL (ref 0.4–1.2)
DEPRECATED MEAN GLUCOSE BLD GHB EST-ACNC: 90 MG/DL (ref 70–126)
DEPRECATED RDW RBC AUTO: 41.4 FL (ref 35–45)
EOSINOPHIL NFR BLD AUTO: 4.6 %
EOSINOPHILS ABSOLUTE: 0.2 THOU/MM3 (ref 0–0.4)
ERYTHROCYTE [DISTWIDTH] IN BLOOD BY AUTOMATED COUNT: 13.1 % (ref 11.5–14.5)
FERRITIN SERPL IA-MCNC: 172 NG/ML (ref 10–291)
GFR SERPL CREATININE-BSD FRML MDRD: > 60 ML/MIN/1.73M2
GLUCOSE SERPL-MCNC: 84 MG/DL (ref 70–108)
HBA1C MFR BLD HPLC: 5 % (ref 4.4–6.4)
HCT VFR BLD AUTO: 40.9 % (ref 37–47)
HDLC SERPL-MCNC: 53 MG/DL
HGB BLD-MCNC: 13.4 GM/DL (ref 12–16)
HGB RETIC QN AUTO: 33 PG (ref 28.2–35.7)
IMM GRANULOCYTES # BLD AUTO: 0.01 THOU/MM3 (ref 0–0.07)
IMM GRANULOCYTES NFR BLD AUTO: 0.2 %
IMM RETICS NFR: 3.4 % (ref 3–15.9)
IRON SERPL-MCNC: 76 UG/DL (ref 50–170)
LDLC SERPL CALC-MCNC: 118 MG/DL
LYMPHOCYTES ABSOLUTE: 2.1 THOU/MM3 (ref 1–4.8)
LYMPHOCYTES NFR BLD AUTO: 44.4 %
MAGNESIUM SERPL-MCNC: 2 MG/DL (ref 1.6–2.4)
MCH RBC QN AUTO: 28.5 PG (ref 26–33)
MCHC RBC AUTO-ENTMCNC: 32.8 GM/DL (ref 32.2–35.5)
MCV RBC AUTO: 87 FL (ref 81–99)
MONOCYTES ABSOLUTE: 0.4 THOU/MM3 (ref 0.4–1.3)
MONOCYTES NFR BLD AUTO: 7.9 %
NEUTROPHILS NFR BLD AUTO: 41.9 %
NRBC BLD AUTO-RTO: 0 /100 WBC
PLATELET # BLD AUTO: 190 THOU/MM3 (ref 130–400)
PMV BLD AUTO: 10.9 FL (ref 9.4–12.4)
POTASSIUM SERPL-SCNC: 4.4 MEQ/L (ref 3.5–5.2)
PROT SERPL-MCNC: 6.5 G/DL (ref 6.1–8)
RBC # BLD AUTO: 4.7 MILL/MM3 (ref 4.2–5.4)
RETICS # AUTO: 45 THOU/MM3 (ref 20–115)
RETICS/RBC NFR AUTO: 1 % (ref 0.5–2)
SEGMENTED NEUTROPHILS ABSOLUTE COUNT: 2 THOU/MM3 (ref 1.8–7.7)
SODIUM SERPL-SCNC: 143 MEQ/L (ref 135–145)
TIBC SERPL-MCNC: 293 UG/DL (ref 171–450)
TRIGL SERPL-MCNC: 115 MG/DL (ref 0–199)
TSH SERPL DL<=0.005 MIU/L-ACNC: 5.02 UIU/ML (ref 0.4–4.2)
WBC # BLD AUTO: 4.8 THOU/MM3 (ref 4.8–10.8)

## 2023-03-24 PROCEDURE — 85046 RETICYTE/HGB CONCENTRATE: CPT

## 2023-03-24 PROCEDURE — 83550 IRON BINDING TEST: CPT

## 2023-03-24 PROCEDURE — 84443 ASSAY THYROID STIM HORMONE: CPT

## 2023-03-24 PROCEDURE — 85025 COMPLETE CBC W/AUTO DIFF WBC: CPT

## 2023-03-24 PROCEDURE — 84207 ASSAY OF VITAMIN B-6: CPT

## 2023-03-24 PROCEDURE — 80053 COMPREHEN METABOLIC PANEL: CPT

## 2023-03-24 PROCEDURE — 84436 ASSAY OF TOTAL THYROXINE: CPT

## 2023-03-24 PROCEDURE — 83540 ASSAY OF IRON: CPT

## 2023-03-24 PROCEDURE — 82728 ASSAY OF FERRITIN: CPT

## 2023-03-24 PROCEDURE — 80061 LIPID PANEL: CPT

## 2023-03-24 PROCEDURE — 83735 ASSAY OF MAGNESIUM: CPT

## 2023-03-24 PROCEDURE — 83036 HEMOGLOBIN GLYCOSYLATED A1C: CPT

## 2023-03-24 PROCEDURE — 36415 COLL VENOUS BLD VENIPUNCTURE: CPT

## 2023-03-24 PROCEDURE — 82607 VITAMIN B-12: CPT

## 2023-03-25 LAB — VIT B12 SERPL-MCNC: 800 PG/ML (ref 211–911)

## 2023-03-28 LAB — T4 SERPL-MCNC: 9.2 UG/DL (ref 4.5–10.9)

## 2023-03-29 LAB — PYRIDOXAL PHOS SERPL-SCNC: 275.5 NMOL/L (ref 20–125)

## 2023-03-30 ENCOUNTER — OFFICE VISIT (OUTPATIENT)
Dept: UROLOGY | Age: 63
End: 2023-03-30
Payer: COMMERCIAL

## 2023-03-30 VITALS
WEIGHT: 156 LBS | HEIGHT: 63 IN | BODY MASS INDEX: 27.64 KG/M2 | SYSTOLIC BLOOD PRESSURE: 108 MMHG | DIASTOLIC BLOOD PRESSURE: 64 MMHG

## 2023-03-30 DIAGNOSIS — N28.9 RENAL LESION: Primary | ICD-10-CM

## 2023-03-30 PROCEDURE — 99203 OFFICE O/P NEW LOW 30 MIN: CPT | Performed by: NURSE PRACTITIONER

## 2023-03-30 ASSESSMENT — ENCOUNTER SYMPTOMS
BACK PAIN: 0
ABDOMINAL PAIN: 0
VOMITING: 0
NAUSEA: 0

## 2023-03-30 NOTE — PROGRESS NOTES
tablet by mouth in the morning and at bedtime 30 tablet 1     No current facility-administered medications for this visit. Past Medical History  Michelle  has a past medical history of Arthritis, Hypothyroid, and IBS (irritable bowel syndrome). Past Surgical History  The patient  has a past surgical history that includes Tubal ligation; Endometrial ablation; pr bx breast needle core w/o imaging guidance spx (Right, 2000); and US Breast Fine Needle Aspiration (Left, 7637-2312). Family History  This patient's family history includes Heart Disease in her mother; High Cholesterol in her father and mother; Stroke in her father. Social History  Michelle  reports that she has never smoked. She has never used smokeless tobacco. She reports current alcohol use. She reports that she does not use drugs. Subjective:      Review of Systems   Constitutional:  Negative for activity change, appetite change, chills, diaphoresis, fatigue, fever and unexpected weight change. Gastrointestinal:  Negative for abdominal pain, nausea and vomiting. Genitourinary:  Negative for decreased urine volume, difficulty urinating, dysuria, flank pain, frequency, hematuria and urgency. Musculoskeletal:  Negative for back pain. Objective:   /64   Ht 5' 3\" (1.6 m)   Wt 156 lb (70.8 kg)   BMI 27.63 kg/m²     Physical Exam  Vitals reviewed. Constitutional:       General: She is not in acute distress. Appearance: Normal appearance. She is well-developed. She is not ill-appearing or diaphoretic. HENT:      Head: Normocephalic and atraumatic. Right Ear: External ear normal.      Left Ear: External ear normal.      Nose: Nose normal.      Mouth/Throat:      Mouth: Mucous membranes are moist.   Eyes:      General: No scleral icterus. Right eye: No discharge. Left eye: No discharge. Neck:      Vascular: No JVD. Trachea: No tracheal deviation.    Cardiovascular:      Rate and Rhythm: Normal rate

## 2023-05-04 ENCOUNTER — OFFICE VISIT (OUTPATIENT)
Dept: UROLOGY | Age: 63
End: 2023-05-04
Payer: COMMERCIAL

## 2023-05-04 VITALS — RESPIRATION RATE: 12 BRPM | BODY MASS INDEX: 25.82 KG/M2 | WEIGHT: 145.7 LBS | HEIGHT: 63 IN

## 2023-05-04 DIAGNOSIS — N28.9 RENAL LESION: Primary | ICD-10-CM

## 2023-05-04 DIAGNOSIS — N28.1 RENAL CYST: ICD-10-CM

## 2023-05-04 PROCEDURE — 99214 OFFICE O/P EST MOD 30 MIN: CPT | Performed by: UROLOGY

## 2023-05-04 RX ORDER — LEVOTHYROXINE SODIUM 0.07 MG/1
75 TABLET ORAL DAILY
COMMUNITY
Start: 2023-03-31

## 2023-05-04 NOTE — PROGRESS NOTES
Chris Selby MD  Urology Clinic office visit    Patient:  Lilian Labor  YOB: 1960  Date: 5/4/2023    HISTORY OF PRESENT ILLNESS:   The patient is a 65 y.o. female who presents today for follow-up for the following problem(s):      1. Renal lesion    2. Renal cyst         Overall the problem(s) : show no change. Associated Symptoms: No dysuria, gross hematuria. Pain Severity:      Summary of old records: N/A    Additional History:   Here for revaluation of renal lesion  recent imaging with renal ultrasound notes a 6.3 cm avascular anechoic structure at the lower left kidney measuring 6.3 cms in size with suggestion of irregular wall thickening. Imaging Reviewed during this Office Visit:   (results were independently reviewed by physician and radiology report verified)  I independently reviewed and verified the images and reports from:    MRI ABDOMEN W WO CONTRAST    Result Date: 4/14/2023  PROCEDURE: MRI ABDOMEN W WO CONTRAST CLINICAL INFORMATION: Renal lesion COMPARISON: Renal ultrasound 2/27/2023, CT abdomen and pelvis 12/21/2022 TECHNIQUE: Multisequence and multiplanar MRI of the abdomen was performed without and with  contrast. T1 and T2 contrast information were emphasized. CONTRAST: 15 mL of ProHance  intravenously. FINDINGS: LOWER THORAX: No significant lower thoracic findings. HEPATOBILIARY: No focal hepatic mass. Unremarkable hepatic surface morphology. The gallbladder, and biliary tree are unremarkable. PANCREAS AND SPLEEN: A pancreatic divisum is suspected. The pancreas is unremarkable. No peripancreatic abnormality. The spleen is not enlarged RETROPERITONEUM: A 5 x 5 cm nonenhancing cyst is seen in the mid to inferior pole of the left kidney. A 7 x 8 mm nonenhancing cyst is seen in the inferior pole of the left kidney. No hydronephrosis. The adrenal glands are not enlarged BOWEL AND PERITONEUM:  No bowel obstruction or acute inflammatory bowel process.   VASCULAR: The

## 2023-08-24 ENCOUNTER — TELEPHONE (OUTPATIENT)
Dept: UROLOGY | Age: 63
End: 2023-08-24

## 2023-08-24 NOTE — TELEPHONE ENCOUNTER
Patient scheduled for US RENAL LIMITED  at [facility] MR on 5/10/2024 ARRIVAL OF 1045AM FOR AN 11AM SCAN. NO CARBONATED BEVERAGES; ARRIVE WELL HYDRATED WITH A FULL BLADDER.     Order mailed with instructions  to the patient

## 2023-09-19 ENCOUNTER — CARE COORDINATION (OUTPATIENT)
Dept: OTHER | Facility: CLINIC | Age: 63
End: 2023-09-19

## 2023-09-19 NOTE — CARE COORDINATION
Ambulatory Care Coordination Note  2023    Patient Current Location: [place]    ACM contacted the patient by telephone. Verified name and  with patient as identifiers. Provided introduction to self, and explanation of the ACM role. Patient states she is just going into work right now and prefers a call back at a later date. Will continue to outreach. Future Appointments   Date Time Provider [address]   2023 10:00 AM [address] [place], [place] [address]MD JAI Heart [facility]   5/10/2024 11:00 AM STR ULTRASOUND RM 2 STRZ US STR Rad/Card   2024 10:15 AM Jorge Hernandez, APRN - CNP N Alexus [facility] Javier HERRING, [facility]  Associate Care Management  Cell [phone]  [email or web address]

## 2023-09-26 ENCOUNTER — CARE COORDINATION (OUTPATIENT)
Dept: OTHER | Facility: CLINIC | Age: 63
End: 2023-09-26

## 2023-09-26 NOTE — CARE COORDINATION
Ambulatory Care Coordination Note  2023    Patient Current Location:  Home: [address] [place] [address]    ACM contacted the patient by telephone. Verified name and  with patient as identifiers. Provided introduction to self, and explanation of the ACM role. Spoke briefly with pt, who states she cannot talk right now as she is getting ready for work; requests ACM send ACM info to her via my chart. My chart message sent to pt. Will attempt one more outreach, if no response from pt. Future Appointments   Date Time Provider [address] [place] [address]   2023 10:00 AM 120[address] [facility] [address], MD N SRPX Heart [facility]   5/10/2024 11:00 AM STR ULTRASOUND RM 2 STRZ US STR Rad/Card   2024 10:15 AM SUSIE Hardy - CNP N [place] [facility] Angie HERRING, [facility]  Associate Care Management  Cell [phone]  [email or web address]

## 2023-10-10 ENCOUNTER — CARE COORDINATION (OUTPATIENT)
Dept: OTHER | Facility: CLINIC | Age: 63
End: 2023-10-10

## 2023-10-10 NOTE — CARE COORDINATION
Ambulatory Care Coordination Note  10/10/2023    Patient Current Location: [place]    ACM contacted the patient by telephone. Verified name and  with patient as identifiers. Provided introduction to self, and explanation of the ACM role. ACM contacted patient for introduction to Associate Care Management related to Assignment from [employer] list. Verified name and  with patient as identifiers. Patient declines care management at this time, stating 'No I am doing fine, I dont have any concerning symptoms. I had issues back in  when I was in hospital and I had EGD and am doing fine with that right now. \" Pt denies any need for assistance with scheduling appts with providers and denies any problems with medications. Pt reluctant to discuss medical questions any further with ACM. ACM provided contact information for self referral if patient would need care management in the future. ACM will sign off at this time. Future Appointments   Date Time Provider [address] [place] [address]   2023 10:00 AM Maryln Fothergill, MD N SRPX Heart [facility]   5/10/2024 11:00 AM STR ULTRASOUND RM 2 STRZ US STR Rad/Card   2024 10:15 AM Martin Andres, APRN - [facility] MSN, RN   Ambulatory Care Manager  Associate Care Management  Cell [phone]  [email or web address]

## 2023-11-27 ENCOUNTER — OFFICE VISIT (OUTPATIENT)
Dept: CARDIOLOGY CLINIC | Age: 63
End: 2023-11-27
Payer: COMMERCIAL

## 2023-11-27 VITALS
BODY MASS INDEX: 26.29 KG/M2 | HEART RATE: 71 BPM | DIASTOLIC BLOOD PRESSURE: 72 MMHG | WEIGHT: 154 LBS | SYSTOLIC BLOOD PRESSURE: 144 MMHG | HEIGHT: 64 IN

## 2023-11-27 DIAGNOSIS — I47.10 SVT (SUPRAVENTRICULAR TACHYCARDIA): ICD-10-CM

## 2023-11-27 DIAGNOSIS — R00.2 PALPITATIONS: Primary | ICD-10-CM

## 2023-11-27 PROCEDURE — 93000 ELECTROCARDIOGRAM COMPLETE: CPT | Performed by: NUCLEAR MEDICINE

## 2023-11-27 PROCEDURE — 99213 OFFICE O/P EST LOW 20 MIN: CPT | Performed by: NUCLEAR MEDICINE

## 2023-11-27 RX ORDER — VENLAFAXINE HYDROCHLORIDE 37.5 MG/1
CAPSULE, EXTENDED RELEASE ORAL
COMMUNITY
Start: 2023-09-09

## 2023-11-27 RX ORDER — PANTOPRAZOLE SODIUM 40 MG/1
40 TABLET, DELAYED RELEASE ORAL DAILY
COMMUNITY

## 2023-11-27 RX ORDER — METOPROLOL SUCCINATE 50 MG/1
TABLET, EXTENDED RELEASE ORAL
Qty: 90 TABLET | Refills: 3 | Status: SHIPPED | OUTPATIENT
Start: 2023-11-27

## 2023-11-27 NOTE — PROGRESS NOTES
Patient here for check up. Patient complains of palpitations. Patient states she is having high anxiety now. EKG done today.

## 2023-11-27 NOTE — PROGRESS NOTES
[address] [address]  Dept: [phone]  Dept Fax: [phone]  Loc: [phone]    Visit Date: 11/27/2023    Rhett Martinez is a 59 y.o. female who presents todayfor:  Chief Complaint   Patient presents with    Check-Up    Palpitations    Hypertension     Had some mesentaric bleed  Did well   Was on ASA  No chest pain   SVt is stable  Palpitation is under control   BP is stable      HPI:  HPI  Past Medical History:   Diagnosis Date    Arthritis     Hypothyroid     IBS (irritable bowel syndrome)       Past Surgical History:   Procedure Laterality Date    ENDOMETRIAL ABLATION      ND BX BREAST NEEDLE CORE W/O IMAGING GUIDANCE SPX Right 2000    done at [address] [place] [address] Left [phone]    done at [facility]     Family History   Problem Relation Age of Onset    High Cholesterol Mother     Heart Disease Mother     High Cholesterol Father     Stroke Father      Social History     Tobacco Use    Smoking status: Never    Smokeless tobacco: Never   Substance Use Topics    Alcohol use: Yes      Current Outpatient Medications   Medication Sig Dispense Refill    pantoprazole (PROTONIX) 40 MG tablet Take 1 tablet by mouth daily      venlafaxine (EFFEXOR XR) 37.5 MG extended release capsule       levothyroxine (SYNTHROID) 75 MCG tablet Take 1 tablet by mouth daily      metoprolol succinate (TOPROL XL) 50 MG extended release tablet TAKE ONE TABLET BY MOUTH ONE TIME A DAY 90 tablet 3    ALPRAZolam (XANAX) 0.5 MG tablet Take 1 tablet by mouth 2 times daily as needed for Anxiety.       buPROPion (WELLBUTRIN SR) 150 MG extended release tablet Take 1 tablet by mouth 2 times daily      colestipol (COLESTID) 1 G tablet Take 2 tablets by mouth daily      vitamin E 400 UNIT capsule Take 1 capsule by mouth daily      Calcium Carbonate (CALCIUM 600 PO) Take by mouth      MAGNESIUM GLYCINATE PLUS PO

## 2024-01-12 ENCOUNTER — HOSPITAL ENCOUNTER (OUTPATIENT)
Dept: MAMMOGRAPHY | Age: 64
Discharge: HOME | End: 2024-01-12
Payer: COMMERCIAL

## 2024-01-12 VITALS — WEIGHT: 154 LBS | BODY MASS INDEX: 26.85 KG/M2

## 2024-01-12 DIAGNOSIS — Z12.39 BREAST SCREENING: ICD-10-CM

## 2024-01-12 PROCEDURE — 77063 BREAST TOMOSYNTHESIS BI: CPT

## 2024-02-05 RX ORDER — METOPROLOL SUCCINATE 50 MG/1
TABLET, EXTENDED RELEASE ORAL
Qty: 90 TABLET | Refills: 3 | Status: SHIPPED | OUTPATIENT
Start: 2024-02-05

## 2024-02-16 ENCOUNTER — OFFICE VISIT (OUTPATIENT)
Dept: ENT CLINIC | Age: 64
End: 2024-02-16
Payer: COMMERCIAL

## 2024-02-16 VITALS
OXYGEN SATURATION: 98 % | HEART RATE: 70 BPM | RESPIRATION RATE: 16 BRPM | HEIGHT: 64 IN | BODY MASS INDEX: 27.35 KG/M2 | WEIGHT: 160.2 LBS | SYSTOLIC BLOOD PRESSURE: 118 MMHG | DIASTOLIC BLOOD PRESSURE: 78 MMHG | TEMPERATURE: 97.3 F

## 2024-02-16 DIAGNOSIS — H93.13 TINNITUS, BILATERAL: Primary | ICD-10-CM

## 2024-02-16 DIAGNOSIS — J32.9 RECURRENT SINUSITIS: ICD-10-CM

## 2024-02-16 PROCEDURE — 99203 OFFICE O/P NEW LOW 30 MIN: CPT | Performed by: PHYSICIAN ASSISTANT

## 2024-02-16 RX ORDER — MELOXICAM 15 MG
TABLET ORAL
COMMUNITY
Start: 2024-01-03

## 2024-02-16 NOTE — PROGRESS NOTES
[facility] [place] [facility]  [facility] EAR, NOSE AND THROAT  [address]  [address]  [facility] [address]  Dept: [phone]  Dept Fax: [phone]  Loc: [phone]    Cici Watts is a 63 y.o. female who was referred by Lakesha Morton MD for:  Chief Complaint   Patient presents with    New Patient     Patient is here as a new patient for otalgia, bilateral and tinnitus. Patient states its not so much pain as it is ringing in the ears. Pt states that it is pretty consistent. Pt noticed this 3-4 weeks ago. Possible trouble hearing   .    HPI:     Cici Watts presents today for evaluation of tinnitus.  Patient reports that she first noticed the tinnitus about a month ago.  She reports it is bilateral and essentially constant, but is less noticeable when she is active.  She states it is louder when she is settling down for the day.  She reports the tinnitus is nonpulsatile.  She denies any otalgia, achiness, fullness/pressure, or muffled hearing since the tinnitus started.  She does report some occasional ear pressure/discomfort when she has sinusitis/URI.  She denies any significant history of occupational noise exposure.  She does work as a pharmacist at [employer] and reports that the tubing network and the pharmacy robot can be somewhat loud, but is not overtly bothersome.  She denies a history of recurrent ear infections, may be had 2-3 in her entire life reportedly.  She never had ear surgery/tubes.  She denies any new medications or illnesses around the time the tinnitus started.  She does have a history of hypothyroidism which is being managed by her PCP.  No family history of thyroid disease/cancer that she is aware of     Subjective:      REVIEW OF SYSTEMS:    Pertinent positives as noted in the HPI. All other systems reviewed and negative.    ALLERGIES:  Cleocin [clindamycin]    Past Medical History:  Past Medical History:   Diagnosis Date    Arthritis     Hypothyroid

## 2024-03-04 ENCOUNTER — HOSPITAL ENCOUNTER (OUTPATIENT)
Dept: AUDIOLOGY | Age: 64
Discharge: HOME | End: 2024-03-04
Payer: COMMERCIAL

## 2024-03-04 PROCEDURE — 92567 TYMPANOMETRY: CPT | Performed by: AUDIOLOGIST

## 2024-03-04 PROCEDURE — 92557 COMPREHENSIVE HEARING TEST: CPT | Performed by: AUDIOLOGIST

## 2024-03-04 NOTE — PROGRESS NOTES
AUDIOLOGICAL EVALUATION      REASON FOR TESTING:  Audiometric evaluation per the request of Kelvin Boles PA-C, due to the diagnosis of tinnitus (bilateral). The patient reports bilateral tinnitus that began in January of 2024. She denies any noticeable hearing loss. She also denies any otalgia, aural pressure and vertigo. She reports longstanding imbalance and explains that she is clumsy. She denies any significant history of noise exposure.    OTOSCOPY: WNL for both ears.     AUDIOGRAM        Reliability: Good    COMMENTS: Normal hearing sensitivity through 4000Hz, sloping to moderate sensorineural hearing loss at 8000Hz, bilaterally. Word recognition ability is excellent at 96% for both ears. Tympanometry revealed normal peak pressure and normal middle ear compliance for both ears.      RECOMMENDATION(S):   1- Today's report will be routed to Kelvin Rivera PA-C for review and to contact the patient.  2- Results were discussed with Ms. Mac. She was given an MARY brochure on tinnitus. Discussed tinnitus management strategies. Binaural amplification could be considered for tinnitus masking benefits, if indicated.  3- VNG testing could be considered, per physician discretion, due to reported imbalance.  4- Annual audiometry is recommended for monitoring purposes. Testing should be completed sooner if any changes are noted in hearing ability.

## 2024-04-09 ENCOUNTER — TELEPHONE (OUTPATIENT)
Dept: ENT CLINIC | Age: 64
End: 2024-04-09

## 2024-04-09 NOTE — TELEPHONE ENCOUNTER
I called the patient back and reviewed audio results. Tinnitus most likely from SNHL in the higher frequencies. She denies vertigo/dysequilibrium, more so tripping on things at times but is not a frequent issue for her. We discussed monitoring hearing/tinnitus vs repeat audio in 1 year. She preferred to monitor symptoms for the time being and will call the ENT office (to likely arrange repeat audio) if there are changes in her symptoms over the next 1-2 years. She will otherwise follow up PRN. She expressed understanding and thanked me.

## 2024-05-08 DIAGNOSIS — N28.1 RENAL CYST: Primary | ICD-10-CM

## 2024-05-10 ENCOUNTER — HOSPITAL ENCOUNTER (OUTPATIENT)
Dept: ULTRASOUND IMAGING | Age: 64
Discharge: HOME | End: 2024-05-10
Attending: UROLOGY
Payer: COMMERCIAL

## 2024-05-10 DIAGNOSIS — N28.1 RENAL CYST: ICD-10-CM

## 2024-05-10 PROCEDURE — 76770 US EXAM ABDO BACK WALL COMP: CPT

## 2024-05-14 ENCOUNTER — OFFICE VISIT (OUTPATIENT)
Dept: UROLOGY | Age: 64
End: 2024-05-14
Payer: COMMERCIAL

## 2024-05-14 VITALS
DIASTOLIC BLOOD PRESSURE: 80 MMHG | BODY MASS INDEX: 28.17 KG/M2 | SYSTOLIC BLOOD PRESSURE: 136 MMHG | WEIGHT: 165 LBS | HEIGHT: 64 IN

## 2024-05-14 DIAGNOSIS — N28.1 RENAL CYST: Primary | ICD-10-CM

## 2024-05-14 PROCEDURE — 99213 OFFICE O/P EST LOW 20 MIN: CPT | Performed by: NURSE PRACTITIONER

## 2024-05-14 RX ORDER — CHLORDIAZEPOXIDE HYDROCHLORIDE AND CLIDINIUM BROMIDE 5; 2.5 MG/1; MG/1
CAPSULE ORAL
COMMUNITY
Start: 2020-05-19

## 2024-05-14 ASSESSMENT — ENCOUNTER SYMPTOMS
BACK PAIN: 0
ABDOMINAL PAIN: 0
NAUSEA: 0

## 2024-05-14 NOTE — PROGRESS NOTES
[facility] [place] [facility]  [facility] UROLOGY  [address]  [address]  [facility] [address]  Dept: [phone]  Loc: [phone]    Visit Date: 5/14/2024        HPI:     Cici Watts is a 63 y.o. female who presents today for:  Chief Complaint   Patient presents with    Other     Renal lesion    Follow-up     1yr follow up       HPI  Pt seen in follow up for renal cyst.      Pt has a hx of bosniak 1 cyst measuring 5 x 5 cms in mid to inferior L kidney and 7 x 8 mm in inferior left kidney.  Here today with follow up renal us.      Current Outpatient Medications   Medication Sig Dispense Refill    LIBRAX 5-2.5 MG per capsule Oral N      vitamin D 25 MCG (1000 UT) CAPS Oral N      Multiple Vitamins-Minerals (CENTRUM SILVER 50+WOMEN PO)       MOBIC 15 MG tablet       metoprolol succinate (TOPROL XL) 50 MG extended release tablet TAKE ONE TABLET BY MOUTH ONCE A DAY 90 tablet 3    pantoprazole (PROTONIX) 40 MG tablet Take 1 tablet by mouth daily      venlafaxine (EFFEXOR XR) 37.5 MG extended release capsule       levothyroxine (SYNTHROID) 75 MCG tablet Take 1 tablet by mouth daily      ALPRAZolam (XANAX) 0.5 MG tablet Take 1 tablet by mouth 2 times daily as needed for Anxiety.      buPROPion (WELLBUTRIN SR) 150 MG extended release tablet Take 1 tablet by mouth 2 times daily      colestipol (COLESTID) 1 G tablet Take 2 tablets by mouth daily      vitamin E 400 UNIT capsule Take 1 capsule by mouth daily      Calcium Carbonate (CALCIUM 600 PO) Take by mouth      MAGNESIUM GLYCINATE PLUS PO Take 100 mg by mouth      traZODone (DESYREL) 50 MG tablet Take 0.5 tablets by mouth nightly      montelukast (SINGULAIR) 10 MG tablet Take 1 tablet by mouth nightly      cetirizine (ZYRTEC) 10 MG tablet Take 1 tablet by mouth nightly      azelastine (ASTELIN) 137 MCG/SPRAY nasal spray 2 sprays by Nasal route nightly Use in each nostril as directed       No current facility-administered medications for

## 2024-05-15 ENCOUNTER — TELEPHONE (OUTPATIENT)
Dept: UROLOGY | Age: 64
End: 2024-05-15

## 2024-05-15 NOTE — TELEPHONE ENCOUNTER
Patient scheduled for US RENAL COMPLETE  at [facility] on 5/9/25.  Arrival of 1045 AM for a 11 AM scan time.  Order mailed with instructions or given to the patient in the office

## 2024-06-26 LAB
CHOLEST SERPL-MCNC: 203 MG/DL (ref 0–199)
FASTING: YES
GLUCOSE SERPL-MCNC: 87 MG/DL (ref 74–109)
HDLC SERPL-MCNC: 53 MG/DL (ref 40–90)
LDLC SERPL CALC-MCNC: 122 MG/DL
TRIGL SERPL-MCNC: 139 MG/DL (ref 0–199)

## 2024-07-20 ENCOUNTER — APPOINTMENT (OUTPATIENT)
Dept: GENERAL RADIOLOGY | Age: 64
End: 2024-07-20
Payer: COMMERCIAL

## 2024-07-20 ENCOUNTER — HOSPITAL ENCOUNTER (EMERGENCY)
Age: 64
Discharge: HOME | End: 2024-07-20
Payer: COMMERCIAL

## 2024-07-20 VITALS
SYSTOLIC BLOOD PRESSURE: 138 MMHG | RESPIRATION RATE: 16 BRPM | DIASTOLIC BLOOD PRESSURE: 74 MMHG | TEMPERATURE: 98.1 F | OXYGEN SATURATION: 100 % | HEART RATE: 78 BPM

## 2024-07-20 DIAGNOSIS — S93.602A SPRAIN OF LEFT FOOT, INITIAL ENCOUNTER: Primary | ICD-10-CM

## 2024-07-20 PROCEDURE — 99203 OFFICE O/P NEW LOW 30 MIN: CPT | Performed by: NURSE PRACTITIONER

## 2024-07-20 PROCEDURE — 99213 OFFICE O/P EST LOW 20 MIN: CPT

## 2024-07-20 PROCEDURE — 73610 X-RAY EXAM OF ANKLE: CPT

## 2024-07-20 PROCEDURE — 73630 X-RAY EXAM OF FOOT: CPT

## 2024-07-20 RX ORDER — IBUPROFEN 200 MG
400 TABLET ORAL EVERY 6 HOURS PRN
Qty: 120 TABLET | Refills: 3 | COMMUNITY
Start: 2024-07-20

## 2024-07-20 ASSESSMENT — PAIN DESCRIPTION - LOCATION: LOCATION: THROAT

## 2024-07-20 ASSESSMENT — PAIN SCALES - GENERAL: PAINLEVEL_OUTOF10: 6

## 2024-07-20 ASSESSMENT — PAIN DESCRIPTION - DESCRIPTORS: DESCRIPTORS: SORE

## 2024-07-20 ASSESSMENT — ENCOUNTER SYMPTOMS: COLOR CHANGE: 1

## 2024-07-20 ASSESSMENT — PAIN DESCRIPTION - PAIN TYPE: TYPE: ACUTE PAIN

## 2024-07-20 ASSESSMENT — PAIN - FUNCTIONAL ASSESSMENT: PAIN_FUNCTIONAL_ASSESSMENT: 0-10

## 2024-07-20 NOTE — ED PROVIDER NOTES
foot, initial encounter      DISPOSITION/ PLAN   DISPOSITION Decision To Discharge 07/20/2024 10:11:59 AM     Patient urgent care with sprain to left foot.  No acute fracture or dislocation seen on x-ray.  Recommend taking over-the-counter antipyretics to control discomfort.  Elevate foot.  Apply ice as necessary.  Discussed typical resolution of foot and ankle sprains.  Advised patient to follow-up with PCP as needed.    PATIENT REFERRED TO:  Lakesha Morton MD  [address] / [place] [address]      DISCHARGE MEDICATIONS:  Discharge Medication List as of 7/20/2024 10:12 AM        START taking these medications    Details   ibuprofen (ADVIL) 200 MG tablet Take 2 tablets by mouth every 6 hours as needed for Pain, Disp-120 tablet, R-3OTC             Discharge Medication List as of 7/20/2024 10:12 AM          Discharge Medication List as of 7/20/2024 10:12 AM          SUSIE Leonard CNP    (Please note that portions of this note were completed with a voice recognition program. Efforts were made to edit the dictations but occasionally words are mis-transcribed.)            Paulo Holland APRN - CNP  07/20/24 3887

## 2024-07-20 NOTE — ED NOTES
Patient walked to room 6 for left foot and ankle pain from a injury on Wednesday night, her ankle and foot rolled outward. Patient has been putting ice on it.      Mary Galdamez RN  07/20/24 6759

## 2024-07-20 NOTE — DISCHARGE INSTR - COC
Name:   Address:  Phone:  Fax:    Dialysis Facility (if applicable)   Name:  Address:  Dialysis Schedule:  Phone:  Fax:    / signature: {Esignature:402117831}    PHYSICIAN SECTION    Prognosis: {Prognosis:9828970899}    Condition at Discharge: { Patient Condition:547148396}    Rehab Potential (if transferring to Rehab): {Prognosis:9356064448}    Recommended Labs or Other Treatments After Discharge: ***    Physician Certification: I certify the above information and transfer of Cici Watts  is necessary for the continuing treatment of the diagnosis listed and that she requires {Admit to Appropriate Level of Care:30019} for {GREATER/LESS:345856812} 30 days.     Update Admission H&P: {CHP DME Changes in HandP:335753584}    PHYSICIAN SIGNATURE:  {Esignature:[id number]}

## 2024-07-22 ENCOUNTER — HOSPITAL ENCOUNTER (OUTPATIENT)
Age: 64
Discharge: HOME | End: 2024-07-22
Payer: COMMERCIAL

## 2024-07-22 LAB
BASOPHILS ABSOLUTE: 0 THOU/MM3 (ref 0–0.1)
BASOPHILS NFR BLD AUTO: 0.5 %
DEPRECATED RDW RBC AUTO: 43.8 FL (ref 35–45)
EOSINOPHIL NFR BLD AUTO: 1.1 %
EOSINOPHILS ABSOLUTE: 0 THOU/MM3 (ref 0–0.4)
ERYTHROCYTE [DISTWIDTH] IN BLOOD BY AUTOMATED COUNT: 13.2 % (ref 11.5–14.5)
HCT VFR BLD AUTO: 38.6 % (ref 37–47)
HGB BLD-MCNC: 13 GM/DL (ref 12–16)
IMM GRANULOCYTES # BLD AUTO: 0.01 THOU/MM3 (ref 0–0.07)
IMM GRANULOCYTES NFR BLD AUTO: 0.3 %
LYMPHOCYTES ABSOLUTE: 1.7 THOU/MM3 (ref 1–4.8)
LYMPHOCYTES NFR BLD AUTO: 43.7 %
MCH RBC QN AUTO: 30.2 PG (ref 26–33)
MCHC RBC AUTO-ENTMCNC: 33.7 GM/DL (ref 32.2–35.5)
MCV RBC AUTO: 89.6 FL (ref 81–99)
MONOCYTES ABSOLUTE: 0.3 THOU/MM3 (ref 0.4–1.3)
MONOCYTES NFR BLD AUTO: 7.9 %
NEUTROPHILS ABSOLUTE: 1.8 THOU/MM3 (ref 1.8–7.7)
NEUTROPHILS NFR BLD AUTO: 46.5 %
NRBC BLD AUTO-RTO: 0 /100 WBC
PLATELET # BLD AUTO: 200 THOU/MM3 (ref 130–400)
PMV BLD AUTO: 11.7 FL (ref 9.4–12.4)
RBC # BLD AUTO: 4.31 MILL/MM3 (ref 4.2–5.4)
WBC # BLD AUTO: 3.8 THOU/MM3 (ref 4.8–10.8)

## 2024-07-22 PROCEDURE — 85025 COMPLETE CBC W/AUTO DIFF WBC: CPT

## 2024-07-22 PROCEDURE — 84443 ASSAY THYROID STIM HORMONE: CPT

## 2024-07-22 PROCEDURE — 36415 COLL VENOUS BLD VENIPUNCTURE: CPT

## 2024-07-22 PROCEDURE — 84439 ASSAY OF FREE THYROXINE: CPT

## 2024-07-22 PROCEDURE — 80053 COMPREHEN METABOLIC PANEL: CPT

## 2024-07-22 PROCEDURE — 83036 HEMOGLOBIN GLYCOSYLATED A1C: CPT

## 2024-07-23 LAB
ALBUMIN SERPL BCG-MCNC: 4.4 G/DL (ref 3.5–5.1)
ALP SERPL-CCNC: 102 U/L (ref 38–126)
ALT SERPL W/O P-5'-P-CCNC: 18 U/L (ref 11–66)
ANION GAP SERPL CALC-SCNC: 13 MEQ/L (ref 8–16)
AST SERPL-CCNC: 24 U/L (ref 5–40)
BILIRUB SERPL-MCNC: 0.4 MG/DL (ref 0.3–1.2)
BUN SERPL-MCNC: 9 MG/DL (ref 7–22)
CALCIUM SERPL-MCNC: 9.4 MG/DL (ref 8.5–10.5)
CHLORIDE SERPL-SCNC: 103 MEQ/L (ref 98–111)
CO2 SERPL-SCNC: 25 MEQ/L (ref 23–33)
CREAT SERPL-MCNC: 0.9 MG/DL (ref 0.4–1.2)
DEPRECATED MEAN GLUCOSE BLD GHB EST-ACNC: 84 MG/DL (ref 70–126)
GFR SERPL CREATININE-BSD FRML MDRD: 72 ML/MIN/1.73M2
GLUCOSE SERPL-MCNC: 94 MG/DL (ref 70–108)
HBA1C MFR BLD HPLC: 4.8 % (ref 4.4–6.4)
POTASSIUM SERPL-SCNC: 4.5 MEQ/L (ref 3.5–5.2)
PROT SERPL-MCNC: 6.6 G/DL (ref 6.1–8)
SODIUM SERPL-SCNC: 141 MEQ/L (ref 135–145)
T4 FREE SERPL-MCNC: 1.19 NG/DL (ref 0.93–1.68)
TSH SERPL DL<=0.005 MIU/L-ACNC: 1.02 UIU/ML (ref 0.4–4.2)

## 2024-09-06 ENCOUNTER — LAB (OUTPATIENT)
Age: 64
End: 2024-09-06

## 2024-09-13 LAB — CYTOLOGY THIN PREP PAP: NORMAL

## 2024-11-27 ENCOUNTER — OFFICE VISIT (OUTPATIENT)
Dept: CARDIOLOGY CLINIC | Age: 64
End: 2024-11-27
Payer: COMMERCIAL

## 2024-11-27 VITALS
HEIGHT: 64 IN | SYSTOLIC BLOOD PRESSURE: 152 MMHG | HEART RATE: 80 BPM | WEIGHT: 166.2 LBS | BODY MASS INDEX: 28.38 KG/M2 | DIASTOLIC BLOOD PRESSURE: 80 MMHG

## 2024-11-27 DIAGNOSIS — I47.10 SVT (SUPRAVENTRICULAR TACHYCARDIA) (HCC): ICD-10-CM

## 2024-11-27 DIAGNOSIS — I10 PRIMARY HYPERTENSION: ICD-10-CM

## 2024-11-27 DIAGNOSIS — R00.2 PALPITATIONS: Primary | ICD-10-CM

## 2024-11-27 PROCEDURE — 3079F DIAST BP 80-89 MM HG: CPT | Performed by: NUCLEAR MEDICINE

## 2024-11-27 PROCEDURE — 3077F SYST BP >= 140 MM HG: CPT | Performed by: NUCLEAR MEDICINE

## 2024-11-27 PROCEDURE — 99213 OFFICE O/P EST LOW 20 MIN: CPT | Performed by: NUCLEAR MEDICINE

## 2024-11-27 PROCEDURE — 93000 ELECTROCARDIOGRAM COMPLETE: CPT | Performed by: NUCLEAR MEDICINE

## 2024-11-27 RX ORDER — METOPROLOL SUCCINATE 50 MG/1
TABLET, EXTENDED RELEASE ORAL
Qty: 90 TABLET | Refills: 3 | Status: SHIPPED | OUTPATIENT
Start: 2024-11-27

## 2024-11-27 NOTE — PROGRESS NOTES
[facility] [place] [facility]  [facility] CARDIOLOGY  [address]  [address]  [facility] [address]  Dept: [phone]  Dept Fax: [phone]  Loc: [phone]    Visit Date: 11/27/2024    Cici Watts is a 64 y.o. female who presents todayfor:  Chief Complaint   Patient presents with    Follow-up     1 yr fu     Palpitations    Hypertension     SVT is stable  No chest pain   No changes in breathing  BP is borderline today   Usually better   No dizziness  No syncope      HPI:  HPI  Past Medical History:   Diagnosis Date    Arthritis     Hypothyroid     IBS (irritable bowel syndrome)       Past Surgical History:   Procedure Laterality Date    ENDOMETRIAL ABLATION      WA BX BREAST NEEDLE CORE W/O IMAGING GUIDANCE SPX Right 2000    done at [facility]    TUBAL LIGATION       BREAST FINE NEEDLE ASPIRATION Left 0637-8717    done at [facility]     Family History   Problem Relation Age of Onset    High Cholesterol Mother     Heart Disease Mother     High Cholesterol Father     Stroke Father     Lung Cancer Maternal Grandfather      Social History     Tobacco Use    Smoking status: Never    Smokeless tobacco: Never   Substance Use Topics    Alcohol use: Yes      Current Outpatient Medications   Medication Sig Dispense Refill    ibuprofen (ADVIL) 200 MG tablet Take 2 tablets by mouth every 6 hours as needed for Pain 120 tablet 3    LIBRAX 5-2.5 MG per capsule Oral N      vitamin D 25 MCG (1000 UT) CAPS Oral N      Multiple Vitamins-Minerals (CENTRUM SILVER 50+WOMEN PO)       MOBIC 15 MG tablet       metoprolol succinate (TOPROL XL) 50 MG extended release tablet TAKE ONE TABLET BY MOUTH ONCE A DAY 90 tablet 3    pantoprazole (PROTONIX) 40 MG tablet Take 1 tablet by mouth daily      venlafaxine (EFFEXOR XR) 37.5 MG extended release capsule       levothyroxine (SYNTHROID) 75 MCG tablet Take 1 tablet by mouth daily      ALPRAZolam (XANAX) 0.5 MG tablet Take 1 tablet by mouth 2 times daily as needed for

## 2025-01-15 ENCOUNTER — HOSPITAL ENCOUNTER (OUTPATIENT)
Dept: MAMMOGRAPHY | Age: 65
Discharge: HOME | End: 2025-01-15
Payer: COMMERCIAL

## 2025-01-15 VITALS — WEIGHT: 170 LBS | HEIGHT: 64 IN | BODY MASS INDEX: 29.02 KG/M2

## 2025-01-15 DIAGNOSIS — Z12.31 ENCOUNTER FOR SCREENING MAMMOGRAM FOR MALIGNANT NEOPLASM OF BREAST: ICD-10-CM

## 2025-01-15 PROCEDURE — 77063 BREAST TOMOSYNTHESIS BI: CPT

## 2025-05-09 ENCOUNTER — HOSPITAL ENCOUNTER (OUTPATIENT)
Dept: ULTRASOUND IMAGING | Age: 65
Discharge: HOME | End: 2025-05-09
Payer: COMMERCIAL

## 2025-05-09 DIAGNOSIS — N28.1 RENAL CYST: ICD-10-CM

## 2025-05-09 PROCEDURE — 76770 US EXAM ABDO BACK WALL COMP: CPT

## 2025-05-15 ENCOUNTER — OFFICE VISIT (OUTPATIENT)
Dept: UROLOGY | Age: 65
End: 2025-05-15
Payer: COMMERCIAL

## 2025-05-15 VITALS
DIASTOLIC BLOOD PRESSURE: 78 MMHG | HEIGHT: 64 IN | SYSTOLIC BLOOD PRESSURE: 120 MMHG | BODY MASS INDEX: 27.83 KG/M2 | WEIGHT: 163 LBS

## 2025-05-15 DIAGNOSIS — N28.1 KIDNEY CYSTS: Primary | ICD-10-CM

## 2025-05-15 LAB
BILIRUBIN, URINE: NEGATIVE
BLOOD URINE, POC: NEGATIVE
CHARACTER, URINE: CLEAR
COLOR, UA: YELLOW
GLUCOSE URINE: NEGATIVE MG/DL
KETONES, URINE: NEGATIVE
LEUKOCYTE CLUMPS, URINE: NEGATIVE
NITRITE, URINE: POSITIVE
PH, URINE: 5.5 (ref 5–9)
PROTEIN, URINE: NEGATIVE MG/DL
SPECIFIC GRAVITY UA: 1.02 (ref 1–1.03)
UROBILINOGEN, URINE: 0.2 EU/DL (ref 0–1)

## 2025-05-15 PROCEDURE — 99213 OFFICE O/P EST LOW 20 MIN: CPT | Performed by: NURSE PRACTITIONER

## 2025-05-15 RX ORDER — LISINOPRIL 20 MG/1
TABLET ORAL
COMMUNITY
Start: 2025-03-31

## 2025-05-15 ASSESSMENT — ENCOUNTER SYMPTOMS
BACK PAIN: 0
ABDOMINAL PAIN: 0
NAUSEA: 0
VOMITING: 0

## 2025-05-15 NOTE — PROGRESS NOTES
[facility] [place] [facility]  [facility] UROLOGY  [address]  [address]  [facility] [address]  Dept: [phone]  Loc: [phone]    Visit Date: 5/15/2025        HPI:     Cici Watts is a 64 y.o. female who presents today for:  Chief Complaint   Patient presents with    1 Year Follow Up     Pt has no complaint of UTI  and states emptying all the way.    Results     KATIA is done       HPI    Pt seen in follow up for renal cyst.       Pt has a hx of bosniak 1 cyst measuring 5 x 5 cms in mid to inferior L kidney and 7 x 8 mm in inferior left kidney.    Here today with follow up renal us.     Current Outpatient Medications   Medication Sig Dispense Refill    lisinopril (PRINIVIL;ZESTRIL) 20 MG tablet       metoprolol succinate (TOPROL XL) 50 MG extended release tablet TAKE ONE TABLET BY MOUTH ONCE A DAY 90 tablet 3    ibuprofen (ADVIL) 200 MG tablet Take 2 tablets by mouth every 6 hours as needed for Pain 120 tablet 3    LIBRAX 5-2.5 MG per capsule Oral N      vitamin D 25 MCG (1000 UT) CAPS Oral N      Multiple Vitamins-Minerals (CENTRUM SILVER 50+WOMEN PO)       MOBIC 15 MG tablet       pantoprazole (PROTONIX) 40 MG tablet Take 1 tablet by mouth daily      venlafaxine (EFFEXOR XR) 37.5 MG extended release capsule       levothyroxine (SYNTHROID) 75 MCG tablet Take 1 tablet by mouth daily      ALPRAZolam (XANAX) 0.5 MG tablet Take 1 tablet by mouth 2 times daily as needed for Anxiety.      buPROPion (WELLBUTRIN SR) 150 MG extended release tablet Take 1 tablet by mouth 2 times daily      colestipol (COLESTID) 1 G tablet Take 2 tablets by mouth daily      vitamin E 400 UNIT capsule Take 1 capsule by mouth daily      Calcium Carbonate (CALCIUM 600 PO) Take by mouth      MAGNESIUM GLYCINATE PLUS PO Take 100 mg by mouth      traZODone (DESYREL) 50 MG tablet Take 0.5 tablets by mouth nightly      montelukast (SINGULAIR) 10 MG tablet Take 1 tablet by mouth nightly      cetirizine (ZYRTEC) 10 MG

## 2025-07-02 ENCOUNTER — HOSPITAL ENCOUNTER (OUTPATIENT)
Age: 65
Discharge: HOME | End: 2025-07-02
Payer: COMMERCIAL

## 2025-07-02 LAB
ALBUMIN SERPL BCG-MCNC: 4.1 G/DL (ref 3.4–4.9)
ALP SERPL-CCNC: 111 U/L (ref 38–126)
ALT SERPL W/O P-5'-P-CCNC: 29 U/L (ref 10–35)
ANION GAP SERPL CALC-SCNC: 10 MEQ/L (ref 8–16)
AST SERPL-CCNC: 27 U/L (ref 10–35)
BASOPHILS ABSOLUTE: 0 THOU/MM3 (ref 0–0.1)
BASOPHILS NFR BLD AUTO: 0.8 %
BILIRUB SERPL-MCNC: 0.4 MG/DL (ref 0.3–1.2)
BUN SERPL-MCNC: 11 MG/DL (ref 8–23)
CALCIUM SERPL-MCNC: 9.4 MG/DL (ref 8.8–10.2)
CHLORIDE SERPL-SCNC: 104 MEQ/L (ref 98–111)
CHOLEST SERPL-MCNC: 218 MG/DL (ref 100–199)
CO2 SERPL-SCNC: 26 MEQ/L (ref 22–29)
CREAT SERPL-MCNC: 1 MG/DL (ref 0.5–0.9)
DEPRECATED MEAN GLUCOSE BLD GHB EST-ACNC: 93 MG/DL (ref 70–126)
DEPRECATED RDW RBC AUTO: 42.9 FL (ref 35–45)
EOSINOPHIL NFR BLD AUTO: 2.2 %
EOSINOPHILS ABSOLUTE: 0.1 THOU/MM3 (ref 0–0.4)
ERYTHROCYTE [DISTWIDTH] IN BLOOD BY AUTOMATED COUNT: 13.2 % (ref 11.5–14.5)
GFR SERPL CREATININE-BSD FRML MDRD: 63 ML/MIN/1.73M2
GLUCOSE SERPL-MCNC: 84 MG/DL (ref 74–109)
HBA1C MFR BLD HPLC: 5.1 % (ref 4–6)
HCT VFR BLD AUTO: 40 % (ref 37–47)
HDLC SERPL-MCNC: 51 MG/DL
HGB BLD-MCNC: 13.3 GM/DL (ref 12–16)
IMM GRANULOCYTES # BLD AUTO: 0.01 THOU/MM3 (ref 0–0.07)
IMM GRANULOCYTES NFR BLD AUTO: 0.2 %
LDLC SERPL CALC-MCNC: 130 MG/DL
LYMPHOCYTES ABSOLUTE: 2.1 THOU/MM3 (ref 1–4.8)
LYMPHOCYTES NFR BLD AUTO: 40.7 %
MCH RBC QN AUTO: 29.3 PG (ref 26–33)
MCHC RBC AUTO-ENTMCNC: 33.3 GM/DL (ref 32.2–35.5)
MCV RBC AUTO: 88.1 FL (ref 81–99)
MONOCYTES ABSOLUTE: 0.4 THOU/MM3 (ref 0.4–1.3)
MONOCYTES NFR BLD AUTO: 7.7 %
NEUTROPHILS ABSOLUTE: 2.5 THOU/MM3 (ref 1.8–7.7)
NEUTROPHILS NFR BLD AUTO: 48.4 %
NRBC BLD AUTO-RTO: 0 /100 WBC
PLATELET # BLD AUTO: 211 THOU/MM3 (ref 130–400)
PMV BLD AUTO: 11.5 FL (ref 9.4–12.4)
POTASSIUM SERPL-SCNC: 4.2 MEQ/L (ref 3.5–5.2)
PROT SERPL-MCNC: 6.4 G/DL (ref 6.4–8.3)
RBC # BLD AUTO: 4.54 MILL/MM3 (ref 4.2–5.4)
SODIUM SERPL-SCNC: 140 MEQ/L (ref 135–145)
T4 FREE SERPL-MCNC: 1.1 NG/DL (ref 0.92–1.68)
TRIGL SERPL-MCNC: 185 MG/DL (ref 0–199)
TSH SERPL DL<=0.05 MIU/L-ACNC: 1.88 UIU/ML (ref 0.27–4.2)
WBC # BLD AUTO: 5.1 THOU/MM3 (ref 4.8–10.8)

## 2025-07-02 PROCEDURE — 84443 ASSAY THYROID STIM HORMONE: CPT

## 2025-07-02 PROCEDURE — 83036 HEMOGLOBIN GLYCOSYLATED A1C: CPT

## 2025-07-02 PROCEDURE — 84439 ASSAY OF FREE THYROXINE: CPT

## 2025-07-02 PROCEDURE — 85025 COMPLETE CBC W/AUTO DIFF WBC: CPT

## 2025-07-02 PROCEDURE — 36415 COLL VENOUS BLD VENIPUNCTURE: CPT

## 2025-07-02 PROCEDURE — 80053 COMPREHEN METABOLIC PANEL: CPT

## 2025-07-02 PROCEDURE — 80061 LIPID PANEL: CPT

## 2025-08-21 ENCOUNTER — HOSPITAL ENCOUNTER (EMERGENCY)
Age: 65
Discharge: HOME | End: 2025-08-21
Payer: COMMERCIAL

## 2025-08-21 ENCOUNTER — APPOINTMENT (OUTPATIENT)
Dept: GENERAL RADIOLOGY | Age: 65
End: 2025-08-21
Payer: COMMERCIAL

## 2025-08-21 VITALS
HEART RATE: 76 BPM | TEMPERATURE: 98.6 F | RESPIRATION RATE: 18 BRPM | BODY MASS INDEX: 29.64 KG/M2 | OXYGEN SATURATION: 100 % | WEIGHT: 170 LBS

## 2025-08-21 DIAGNOSIS — S63.92XA SPRAIN OF LEFT HAND, INITIAL ENCOUNTER: Primary | ICD-10-CM

## 2025-08-21 PROCEDURE — 99213 OFFICE O/P EST LOW 20 MIN: CPT

## 2025-08-21 PROCEDURE — 99212 OFFICE O/P EST SF 10 MIN: CPT

## 2025-08-21 PROCEDURE — 73120 X-RAY EXAM OF HAND: CPT

## 2025-08-21 ASSESSMENT — PAIN - FUNCTIONAL ASSESSMENT
PAIN_FUNCTIONAL_ASSESSMENT: ACTIVITIES ARE NOT PREVENTED
PAIN_FUNCTIONAL_ASSESSMENT: 0-10

## 2025-08-21 ASSESSMENT — PAIN DESCRIPTION - ORIENTATION: ORIENTATION: LEFT

## 2025-08-21 ASSESSMENT — PAIN SCALES - GENERAL: PAINLEVEL_OUTOF10: 7

## 2025-08-21 ASSESSMENT — PAIN DESCRIPTION - LOCATION: LOCATION: HAND

## 2025-08-21 ASSESSMENT — PAIN DESCRIPTION - DESCRIPTORS: DESCRIPTORS: ACHING;SHARP

## 2025-08-21 ASSESSMENT — PAIN DESCRIPTION - PAIN TYPE: TYPE: ACUTE PAIN

## 2025-08-21 ASSESSMENT — PAIN DESCRIPTION - FREQUENCY: FREQUENCY: CONTINUOUS
